# Patient Record
Sex: FEMALE | Race: WHITE | NOT HISPANIC OR LATINO
[De-identification: names, ages, dates, MRNs, and addresses within clinical notes are randomized per-mention and may not be internally consistent; named-entity substitution may affect disease eponyms.]

---

## 2017-03-09 ENCOUNTER — TRANSCRIPTION ENCOUNTER (OUTPATIENT)
Age: 62
End: 2017-03-09

## 2019-04-12 ENCOUNTER — APPOINTMENT (OUTPATIENT)
Dept: ORTHOPEDIC SURGERY | Facility: CLINIC | Age: 64
End: 2019-04-12
Payer: COMMERCIAL

## 2019-04-12 ENCOUNTER — INPATIENT (INPATIENT)
Facility: HOSPITAL | Age: 64
LOS: 1 days | Discharge: HOME | End: 2019-04-14
Attending: STUDENT IN AN ORGANIZED HEALTH CARE EDUCATION/TRAINING PROGRAM | Admitting: STUDENT IN AN ORGANIZED HEALTH CARE EDUCATION/TRAINING PROGRAM
Payer: COMMERCIAL

## 2019-04-12 VITALS
HEART RATE: 66 BPM | BODY MASS INDEX: 22.08 KG/M2 | WEIGHT: 120 LBS | DIASTOLIC BLOOD PRESSURE: 68 MMHG | HEIGHT: 62 IN | SYSTOLIC BLOOD PRESSURE: 106 MMHG

## 2019-04-12 VITALS
HEART RATE: 73 BPM | OXYGEN SATURATION: 100 % | DIASTOLIC BLOOD PRESSURE: 71 MMHG | TEMPERATURE: 98 F | RESPIRATION RATE: 18 BRPM | SYSTOLIC BLOOD PRESSURE: 114 MMHG

## 2019-04-12 LAB
ALBUMIN SERPL ELPH-MCNC: 4.7 G/DL — SIGNIFICANT CHANGE UP (ref 3.3–5)
ALP SERPL-CCNC: 70 U/L — SIGNIFICANT CHANGE UP (ref 40–120)
ALT FLD-CCNC: 27 U/L — SIGNIFICANT CHANGE UP (ref 4–33)
ANION GAP SERPL CALC-SCNC: 12 MMO/L — SIGNIFICANT CHANGE UP (ref 7–14)
AST SERPL-CCNC: 38 U/L — HIGH (ref 4–32)
BILIRUB SERPL-MCNC: 0.6 MG/DL — SIGNIFICANT CHANGE UP (ref 0.2–1.2)
BUN SERPL-MCNC: 21 MG/DL — SIGNIFICANT CHANGE UP (ref 7–23)
CALCIUM SERPL-MCNC: 9.5 MG/DL — SIGNIFICANT CHANGE UP (ref 8.4–10.5)
CHLORIDE SERPL-SCNC: 102 MMOL/L — SIGNIFICANT CHANGE UP (ref 98–107)
CO2 SERPL-SCNC: 27 MMOL/L — SIGNIFICANT CHANGE UP (ref 22–31)
CREAT SERPL-MCNC: 0.81 MG/DL — SIGNIFICANT CHANGE UP (ref 0.5–1.3)
GLUCOSE SERPL-MCNC: 96 MG/DL — SIGNIFICANT CHANGE UP (ref 70–99)
HCT VFR BLD CALC: 41.5 % — SIGNIFICANT CHANGE UP (ref 34.5–45)
HGB BLD-MCNC: 14 G/DL — SIGNIFICANT CHANGE UP (ref 11.5–15.5)
MCHC RBC-ENTMCNC: 30.2 PG — SIGNIFICANT CHANGE UP (ref 27–34)
MCHC RBC-ENTMCNC: 33.7 % — SIGNIFICANT CHANGE UP (ref 32–36)
MCV RBC AUTO: 89.4 FL — SIGNIFICANT CHANGE UP (ref 80–100)
NRBC # FLD: 0 K/UL — SIGNIFICANT CHANGE UP (ref 0–0)
PLATELET # BLD AUTO: 213 K/UL — SIGNIFICANT CHANGE UP (ref 150–400)
PMV BLD: 11.8 FL — SIGNIFICANT CHANGE UP (ref 7–13)
POTASSIUM SERPL-MCNC: 4.2 MMOL/L — SIGNIFICANT CHANGE UP (ref 3.5–5.3)
POTASSIUM SERPL-SCNC: 4.2 MMOL/L — SIGNIFICANT CHANGE UP (ref 3.5–5.3)
PROT SERPL-MCNC: 7.3 G/DL — SIGNIFICANT CHANGE UP (ref 6–8.3)
RBC # BLD: 4.64 M/UL — SIGNIFICANT CHANGE UP (ref 3.8–5.2)
RBC # FLD: 12.4 % — SIGNIFICANT CHANGE UP (ref 10.3–14.5)
SODIUM SERPL-SCNC: 141 MMOL/L — SIGNIFICANT CHANGE UP (ref 135–145)
WBC # BLD: 9.14 K/UL — SIGNIFICANT CHANGE UP (ref 3.8–10.5)
WBC # FLD AUTO: 9.14 K/UL — SIGNIFICANT CHANGE UP (ref 3.8–10.5)

## 2019-04-12 PROCEDURE — 72148 MRI LUMBAR SPINE W/O DYE: CPT | Mod: 26

## 2019-04-12 PROCEDURE — 99203 OFFICE O/P NEW LOW 30 MIN: CPT

## 2019-04-12 RX ORDER — MORPHINE SULFATE 50 MG/1
2 CAPSULE, EXTENDED RELEASE ORAL ONCE
Qty: 0 | Refills: 0 | Status: DISCONTINUED | OUTPATIENT
Start: 2019-04-12 | End: 2019-04-12

## 2019-04-12 RX ORDER — DEXAMETHASONE 0.5 MG/5ML
10 ELIXIR ORAL ONCE
Qty: 0 | Refills: 0 | Status: DISCONTINUED | OUTPATIENT
Start: 2019-04-12 | End: 2019-04-12

## 2019-04-12 RX ORDER — MORPHINE SULFATE 50 MG/1
4 CAPSULE, EXTENDED RELEASE ORAL ONCE
Qty: 0 | Refills: 0 | Status: DISCONTINUED | OUTPATIENT
Start: 2019-04-12 | End: 2019-04-12

## 2019-04-12 RX ORDER — LIDOCAINE 4 G/100G
1 CREAM TOPICAL ONCE
Qty: 0 | Refills: 0 | Status: COMPLETED | OUTPATIENT
Start: 2019-04-12 | End: 2019-04-12

## 2019-04-12 RX ORDER — METHOCARBAMOL 500 MG/1
750 TABLET, FILM COATED ORAL ONCE
Qty: 0 | Refills: 0 | Status: COMPLETED | OUTPATIENT
Start: 2019-04-12 | End: 2019-04-12

## 2019-04-12 RX ORDER — ONDANSETRON 8 MG/1
4 TABLET, FILM COATED ORAL ONCE
Qty: 0 | Refills: 0 | Status: COMPLETED | OUTPATIENT
Start: 2019-04-12 | End: 2019-04-12

## 2019-04-12 RX ORDER — DIAZEPAM 5 MG
5 TABLET ORAL ONCE
Qty: 0 | Refills: 0 | Status: DISCONTINUED | OUTPATIENT
Start: 2019-04-12 | End: 2019-04-12

## 2019-04-12 RX ORDER — DIAZEPAM 5 MG
2 TABLET ORAL ONCE
Qty: 0 | Refills: 0 | Status: DISCONTINUED | OUTPATIENT
Start: 2019-04-12 | End: 2019-04-12

## 2019-04-12 RX ORDER — HYDROMORPHONE HYDROCHLORIDE 2 MG/ML
1 INJECTION INTRAMUSCULAR; INTRAVENOUS; SUBCUTANEOUS ONCE
Qty: 0 | Refills: 0 | Status: DISCONTINUED | OUTPATIENT
Start: 2019-04-12 | End: 2019-04-12

## 2019-04-12 RX ORDER — KETOROLAC TROMETHAMINE 30 MG/ML
30 SYRINGE (ML) INJECTION ONCE
Qty: 0 | Refills: 0 | Status: DISCONTINUED | OUTPATIENT
Start: 2019-04-12 | End: 2019-04-12

## 2019-04-12 RX ORDER — DEXAMETHASONE 0.5 MG/5ML
10 ELIXIR ORAL EVERY 6 HOURS
Qty: 0 | Refills: 0 | Status: DISCONTINUED | OUTPATIENT
Start: 2019-04-12 | End: 2019-04-14

## 2019-04-12 RX ORDER — DEXAMETHASONE 0.5 MG/5ML
10 ELIXIR ORAL ONCE
Qty: 0 | Refills: 0 | Status: COMPLETED | OUTPATIENT
Start: 2019-04-12 | End: 2019-04-12

## 2019-04-12 RX ADMIN — MORPHINE SULFATE 2 MILLIGRAM(S): 50 CAPSULE, EXTENDED RELEASE ORAL at 23:10

## 2019-04-12 RX ADMIN — MORPHINE SULFATE 4 MILLIGRAM(S): 50 CAPSULE, EXTENDED RELEASE ORAL at 14:31

## 2019-04-12 RX ADMIN — Medication 102 MILLIGRAM(S): at 22:38

## 2019-04-12 RX ADMIN — HYDROMORPHONE HYDROCHLORIDE 1 MILLIGRAM(S): 2 INJECTION INTRAMUSCULAR; INTRAVENOUS; SUBCUTANEOUS at 19:02

## 2019-04-12 RX ADMIN — Medication 5 MILLIGRAM(S): at 15:05

## 2019-04-12 RX ADMIN — Medication 30 MILLIGRAM(S): at 22:38

## 2019-04-12 RX ADMIN — MORPHINE SULFATE 2 MILLIGRAM(S): 50 CAPSULE, EXTENDED RELEASE ORAL at 22:42

## 2019-04-12 RX ADMIN — MORPHINE SULFATE 4 MILLIGRAM(S): 50 CAPSULE, EXTENDED RELEASE ORAL at 15:25

## 2019-04-12 RX ADMIN — Medication 102 MILLIGRAM(S): at 16:45

## 2019-04-12 RX ADMIN — Medication 30 MILLIGRAM(S): at 23:10

## 2019-04-12 RX ADMIN — Medication 10 MILLIGRAM(S): at 17:15

## 2019-04-12 RX ADMIN — HYDROMORPHONE HYDROCHLORIDE 1 MILLIGRAM(S): 2 INJECTION INTRAMUSCULAR; INTRAVENOUS; SUBCUTANEOUS at 18:44

## 2019-04-12 RX ADMIN — LIDOCAINE 1 PATCH: 4 CREAM TOPICAL at 16:10

## 2019-04-12 RX ADMIN — METHOCARBAMOL 750 MILLIGRAM(S): 500 TABLET, FILM COATED ORAL at 21:38

## 2019-04-12 RX ADMIN — Medication 10 MILLIGRAM(S): at 23:15

## 2019-04-12 NOTE — ED ADULT TRIAGE NOTE - CHIEF COMPLAINT QUOTE
sent by spine doctor for severe pain, pt c/o left leg pain for 2 weeks worse today denies injury or medical Hx.  reports took steroids, anti inflammatory and NSAID with no relief.

## 2019-04-12 NOTE — ED ADULT NURSE NOTE - NSIMPLEMENTINTERV_GEN_ALL_ED
Implemented All Fall Risk Interventions:  [place] to call system. Call bell, personal items and telephone within reach. Instruct patient to call for assistance. Room bathroom lighting operational. Non-slip footwear when patient is off stretcher. Physically safe environment: no spills, clutter or unnecessary equipment. Stretcher in lowest position, wheels locked, appropriate side rails in place. Provide visual cue, wrist band, yellow gown, etc. Monitor gait and stability. Monitor for mental status changes and reorient to person, place, and time. Review medications for side effects contributing to fall risk. Reinforce activity limits and safety measures with patient and family.

## 2019-04-12 NOTE — ED PROVIDER NOTE - ATTENDING CONTRIBUTION TO CARE
Seen and examined, mild distress, c/o worsening back and leg pain, states used vicodin and metaxalone past 2d without relief, only tylenol this a.m., states leg gets numb during pain but at time of eval. no numbness or focal weakness. Clear lungs, heart reg, NT abd, NT spine, painful ROM L hip to flex/rotation reproducing leg pain, good distal pulses, no edema, NT calves.

## 2019-04-12 NOTE — ED CDU PROVIDER INITIAL DAY NOTE - MEDICAL DECISION MAKING DETAILS
63 yo F with no sig PMHX Pt with left leg pain starting from left buttock extending down left leg. started 2 weeks ago, worse x 3 days. [place] lot of dancing and then a lot of sightseeing with walking, pain then developed after that, no medication as stated above have worked. Denies red flag back pain symptoms. Attempt by ortho team to expedite MRI, MRI backed up. Pt sent to CDU for pain control and MRI.

## 2019-04-12 NOTE — ED PROVIDER NOTE - PHYSICAL EXAMINATION
General: Patient awake alert NAD.   HEENT: normocephalic, atraumatic, EMOI, no scleral icterus.   Cardiac: RRR, S1, S2, no murmur, rubs, gallop.   LUNGS: CTA B/L no wheeze, rhonchi, rales.   Abdomen: soft NT, ND, no rebound no guarding, no CVA tenderness.   EXT: + straight leg raise at 15degreees on left. Negative on right. no edema, no calf tenderness b/l. + abnormal gait due to leg pain.   Neuro: A&Ox3, no focal neurological deficits, CN 2-12 grossly intact  Skin: warm, dry, no rash, no lesions

## 2019-04-12 NOTE — ED PROVIDER NOTE - NS ED ROS FT
GENERAL: No fever, chills  EYES: no vision changes, no discharge.   HEENT: no difficulty  swallowing or speaking   CARDIAC: no chest pain/pressure, SOB, lower ex edema  PULMONARY: no cough, SOB  GI: no abdominal pain, n/v/d/c  : no dysuria, frequency, hematuria  SKIN: no rashes, lesions, vesicles  NEURO: no headache, lightheadedness, paraesthesias.   MSK: + Left leg pain.

## 2019-04-12 NOTE — CONSULT NOTE ADULT - SUBJECTIVE AND OBJECTIVE BOX
Patient seen and examined in the ED.  She presents from Dr. Tao's clinic secondary to acute left lower extremity pain and paresthesia with no inciting mechanism. The patient reports an approximate two week history of lower back pain with progressively worsening left lower extremity pain and subjective weakness over the past three days.   She expresses difficulty with ambulation and laying supine secondary to severe pain described as sharp, burning and cramping, associated with numbness.  Denies saddle anesthesia, issues with bowel or bladder incontinence, gait disturbances, issues with balance or tripping.     The patient has history of lower back pain without radiculopathy many years ago for which she had a lumbar MRI revealing disc herniations.  Pain self-limited.  Patient has not had any epidural injections, physical therapy or medical management for this issue.          HEALTH ISSUES - PROBLEM Dx:          MEDICATIONS  (STANDING):  dexamethasone  Injectable 10 milliGRAM(s) IV Push Once      Allergies    penicillin (Unknown)    Intolerances        PAST MEDICAL & SURGICAL HISTORY:  No pertinent past medical history  No significant past surgical history                            14.0   9.14  )-----------( 213      ( 12 Apr 2019 14:45 )             41.5       12 Apr 2019 14:45    141    |  102    |  21     ----------------------------<  96     4.2     |  27     |  0.81     Ca    9.5        12 Apr 2019 14:45    TPro  7.3    /  Alb  4.7    /  TBili  0.6    /  DBili  x      /  AST  38     /  ALT  27     /  AlkPhos  70     12 Apr 2019 14:45              Vital Signs Last 24 Hrs  T(C): 36.4 (04-12-19 @ 12:54), Max: 36.4 (04-12-19 @ 12:54)  T(F): 97.6 (04-12-19 @ 12:54), Max: 97.6 (04-12-19 @ 12:54)  HR: 73 (04-12-19 @ 12:54) (73 - 73)  BP: 114/71 (04-12-19 @ 12:54) (114/71 - 114/71)  BP(mean): --  RR: 18 (04-12-19 @ 12:54) (18 - 18)  SpO2: 100% (04-12-19 @ 12:54) (100% - 100%)    Gen: NAD    Spine PE:  Skin intact  No gross deformity  No midnline TTP C/T/L/S spine  No bony step offs  No paraspinal muscle ttp/hypertonicity  Positive Straight leg raise- Left  Negative clonus  Negative babinski  Negative an  No saddle anesthesia    Motor:                                L2             L3             L4               L5            S1  R         5/5           5/5          5/5             5/5           5/5  L          5/5          5/5           4/5             4/5           4/5    Sensory:                        L2          L3         L4      L5       S1         (0=absent, 1=impaired, 2=normal, NT=not testable)  R         2            2            2        2        2  L          2            2           2        2         2        A/P: 65 y/o F with acute left lumbar radiculopathy.  Patient is neurologically stable.  We will have her obtain an MRI of the lumbar spine to evaluate for underlying pathology contributing to the clinical manifestations.        Recommend:   STAT MRI Lumbar Spine without Contrast,      IV Dexamethasone 10 mg q 6 h x 4 doses,   PO/IV pain regimen  Ortho to follow-up MRI  Notify Ortho with any changes in neurological status and with any questions  #[id number]

## 2019-04-12 NOTE — ED CDU PROVIDER INITIAL DAY NOTE - PROGRESS NOTE DETAILS
Patient received at sign out from CDU day team, pt presented to ED sent by Orthopedist Dr. Tao for Lt side back pain w/ radiculopathy. Pt seen and worked up in ED, ortho consulted. Pt transferred to CDU for MRI's, will continue with current tx plan, will order robaxin for pain. Will monitor closely.

## 2019-04-12 NOTE — ED PROVIDER NOTE - CLINICAL SUMMARY MEDICAL DECISION MAKING FREE TEXT BOX
65 yo female with no pmhx presents with L leg pain. + straight leg raise. ddx: sciatica, herniation, cord compression. Low suspicion for cord compression due to no red flag symptoms. Pain control, MRI, ortho consult.

## 2019-04-12 NOTE — ED PROVIDER NOTE - OBJECTIVE STATEMENT
65 yo female with no pmhx presents with L leg pain. Leg pain starts in the L buttock, sharp electric shock and numbness that radiates down to toes, worse with flexion of hip. Pain chronic, but intermittently worse. At baseline, patient is unable to find comfortable position. During a spasm episode, pt is unable to stand or walk. Denies urinary and bowel incontinence/retention, saddle anesthesia or lower extremity weakness, no other complaints. Took Ibuprofen, Metaxalone, Vicodin today without relief. Pt sent in from Dr. Tao (ortho) office for pain control and MRI. Per patient and , she may require surgery this weekend.    Triage note read, states back pain, but pt denies back pain, endorse that it is L leg sciatica pain. 65 yo female with no pmhx presents with L leg pain x 3 days. Leg pain starts in the L buttock, sharp electric shock and numbness that radiates down to toes, worse with flexion of hip. Pain chronic, but intermittently worse. At baseline, patient is unable to find comfortable position. During a spasm episode, pt is unable to stand or walk. Denies urinary and bowel incontinence/retention, saddle anesthesia or lower extremity weakness, no other complaints. Took Ibuprofen, Metaxalone, prednisone today and Vicodin yesterday without relief. Pt sent in from Dr. Tao (ortho) office for pain control and MRI. Per patient and , she may require surgery this weekend.    Triage note read, states back pain, but pt denies back pain, endorse that it is L leg sciatica pain.

## 2019-04-12 NOTE — H&P ADULT - HISTORY OF PRESENT ILLNESS
Patient seen and examined in the ED.  She presents from Dr. Tao's clinic secondary to acute left lower extremity pain and paresthesia with no inciting mechanism. The patient reports an approximate two week history of lower back pain with progressively worsening left lower extremity pain and subjective weakness over the past three days.   She expresses difficulty with ambulation and laying supine secondary to severe pain described as sharp, burning and cramping, associated with numbness.  Denies saddle anesthesia, issues with bowel or bladder incontinence, gait disturbances, issues with balance or tripping.     The patient has history of lower back pain without radiculopathy many years ago for which she had a lumbar MRI revealing disc herniations.  Pain self-limited.  Patient has not had any epidural injections, physical therapy or medical management for this issue.          HEALTH ISSUES - PROBLEM Dx:          MEDICATIONS  (STANDING):  dexamethasone  Injectable 10 milliGRAM(s) IV Push Once      Allergies    penicillin (Unknown)    Intolerances        PAST MEDICAL & SURGICAL HISTORY:  No pertinent past medical history  No significant past surgical history                            14.0   9.14  )-----------( 213      ( 12 Apr 2019 14:45 )             41.5       12 Apr 2019 14:45    141    |  102    |  21     ----------------------------<  96     4.2     |  27     |  0.81     Ca    9.5        12 Apr 2019 14:45    TPro  7.3    /  Alb  4.7    /  TBili  0.6    /  DBili  x      /  AST  38     /  ALT  27     /  AlkPhos  70     12 Apr 2019 14:45              Vital Signs Last 24 Hrs  T(C): 36.4 (04-12-19 @ 12:54), Max: 36.4 (04-12-19 @ 12:54)  T(F): 97.6 (04-12-19 @ 12:54), Max: 97.6 (04-12-19 @ 12:54)  HR: 73 (04-12-19 @ 12:54) (73 - 73)  BP: 114/71 (04-12-19 @ 12:54) (114/71 - 114/71)  BP(mean): --  RR: 18 (04-12-19 @ 12:54) (18 - 18)  SpO2: 100% (04-12-19 @ 12:54) (100% - 100%)    Gen: NAD    Spine PE:  Skin intact  No gross deformity  No midnline TTP C/T/L/S spine  No bony step offs  No paraspinal muscle ttp/hypertonicity  Positive Straight leg raise- Left  Negative clonus  Negative babinski  Negative an  No saddle anesthesia    Motor:                                L2             L3             L4               L5            S1  R         5/5           5/5          5/5             5/5           5/5  L          5/5          5/5           4/5             4/5           4/5    Sensory:                        L2          L3         L4      L5       S1         (0=absent, 1=impaired, 2=normal, NT=not testable)  R         2            2            2        2        2  L          2            2           2        2         2        A/P: 63 y/o F with acute left lumbar radiculopathy.  Patient is neurologically stable.        Recommend:    - MRI Lumbar Spine without Contrast,      - IV Dexamethasone 10 mg q 6 h x 4 doses,   - PO/IV pain regimen  - Ortho to admit (Dr. Tao) for observation

## 2019-04-12 NOTE — ED CDU PROVIDER INITIAL DAY NOTE - NEURO NEGATIVE STATEMENT, MLM
no loss of consciousness, no gait abnormality, no headache, no sensory deficits, and left leg pain and weakness

## 2019-04-12 NOTE — HISTORY OF PRESENT ILLNESS
[de-identified] : Ms. SANTA HERNANDEZ  is a 64 year old female who presents with intractable left leg pain for the past 2 weeks.  Minimal back pain  Normal bowel and bladder control.   Denies any recent fevers, chills, sweats, weight loss, or infection.  She has not been able to sleep for the past 2 nights. \par

## 2019-04-12 NOTE — ED CDU PROVIDER INITIAL DAY NOTE - DETAILS
63 yo F with back pain left sided, to left lower leg x 2 weeks worse x 3 days. no previous surgeries.  orthopedic Dr Tao, sent in by office.

## 2019-04-12 NOTE — ED PROVIDER NOTE - PROGRESS NOTE DETAILS
Lyly Triplett M.D. Resident  Called Dr. Tao office. Unable to reach him. Lyly Triplett M.D. Resident  Paged ortho resident

## 2019-04-12 NOTE — ED ADULT NURSE NOTE - OBJECTIVE STATEMENT
pt c/o back pain radiating down left leg x2 weeks, sent by PMD. Line placed, labs sent, medicated as per order.

## 2019-04-12 NOTE — PHYSICAL EXAM
[Wheelchair] : uses a wheelchair [de-identified] : On physical examination she is in significant pain seated in a wheelchair. Markedly positive straight leg raise. Grossly preserved strength limited by pain.

## 2019-04-12 NOTE — DISCUSSION/SUMMARY
[de-identified] : Her severe intractable and worsening pain I recommended emergent evaluation in the emergency room. I will followup with her care there.

## 2019-04-12 NOTE — ED CDU PROVIDER INITIAL DAY NOTE - ATTENDING CONTRIBUTION TO CARE
Dr. Brothers:  I performed a face to face bedside interview with patient regarding history of present illness, review of symptoms and past medical history. I completed an independent physical exam.  I have discussed patient's plan of care with PA.   I agree with note as stated above, having amended the EMR as needed to reflect my findings.   This includes HISTORY OF PRESENT ILLNESS, HIV, PAST MEDICAL/SURGICAL/FAMILY/SOCIAL HISTORY, ALLERGIES AND HOME MEDICATIONS, REVIEW OF SYSTEMS, PHYSICAL EXAM, and any PROGRESS NOTES during the time I functioned as the attending physician for this patient.    64F denies pmh presented to ED with lower back pain radiating to LLE that is limiting her ability to walk.  +Associated spasms.  Denies incontinence/retention, numbness, weakness.  Had seen her orthopedic doctor (Dr. Tao) and given oral meds but pain not controlled.  Sent to ED for further evaluation/management.    Exam:  - nad  - rrr  - ctab  - abd soft ntnd  - no midline spinal TTP, 5/5 strength dorsiflexion and plantar flexion, 2+ pedal pulses    A/P  - LLE pain and back pain  - MRI  - f/u ortho recs

## 2019-04-13 DIAGNOSIS — M54.16 RADICULOPATHY, LUMBAR REGION: ICD-10-CM

## 2019-04-13 LAB — CK SERPL-CCNC: 65 U/L — SIGNIFICANT CHANGE UP (ref 25–170)

## 2019-04-13 RX ORDER — OXYCODONE AND ACETAMINOPHEN 5; 325 MG/1; MG/1
1 TABLET ORAL ONCE
Qty: 0 | Refills: 0 | Status: DISCONTINUED | OUTPATIENT
Start: 2019-04-13 | End: 2019-04-13

## 2019-04-13 RX ORDER — HYDROMORPHONE HYDROCHLORIDE 2 MG/ML
0.5 INJECTION INTRAMUSCULAR; INTRAVENOUS; SUBCUTANEOUS EVERY 4 HOURS
Qty: 0 | Refills: 0 | Status: DISCONTINUED | OUTPATIENT
Start: 2019-04-13 | End: 2019-04-14

## 2019-04-13 RX ORDER — ACETAMINOPHEN 500 MG
1000 TABLET ORAL ONCE
Qty: 0 | Refills: 0 | Status: COMPLETED | OUTPATIENT
Start: 2019-04-13 | End: 2019-04-13

## 2019-04-13 RX ORDER — OXYCODONE HYDROCHLORIDE 5 MG/1
10 TABLET ORAL EVERY 6 HOURS
Qty: 0 | Refills: 0 | Status: DISCONTINUED | OUTPATIENT
Start: 2019-04-13 | End: 2019-04-14

## 2019-04-13 RX ORDER — DEXAMETHASONE 0.5 MG/5ML
10 ELIXIR ORAL EVERY 6 HOURS
Qty: 0 | Refills: 0 | Status: DISCONTINUED | OUTPATIENT
Start: 2019-04-13 | End: 2019-04-13

## 2019-04-13 RX ORDER — ACETAMINOPHEN 500 MG
1000 TABLET ORAL ONCE
Qty: 0 | Refills: 0 | Status: COMPLETED | OUTPATIENT
Start: 2019-04-14 | End: 2019-04-14

## 2019-04-13 RX ORDER — MAGNESIUM HYDROXIDE 400 MG/1
30 TABLET, CHEWABLE ORAL EVERY 12 HOURS
Qty: 0 | Refills: 0 | Status: DISCONTINUED | OUTPATIENT
Start: 2019-04-13 | End: 2019-04-14

## 2019-04-13 RX ORDER — KETOROLAC TROMETHAMINE 30 MG/ML
30 SYRINGE (ML) INJECTION EVERY 6 HOURS
Qty: 0 | Refills: 0 | Status: DISCONTINUED | OUTPATIENT
Start: 2019-04-13 | End: 2019-04-14

## 2019-04-13 RX ORDER — SENNA PLUS 8.6 MG/1
2 TABLET ORAL AT BEDTIME
Qty: 0 | Refills: 0 | Status: DISCONTINUED | OUTPATIENT
Start: 2019-04-13 | End: 2019-04-14

## 2019-04-13 RX ORDER — DIAZEPAM 5 MG
5 TABLET ORAL ONCE
Qty: 0 | Refills: 0 | Status: DISCONTINUED | OUTPATIENT
Start: 2019-04-13 | End: 2019-04-13

## 2019-04-13 RX ORDER — DIAZEPAM 5 MG
5 TABLET ORAL EVERY 12 HOURS
Qty: 0 | Refills: 0 | Status: DISCONTINUED | OUTPATIENT
Start: 2019-04-13 | End: 2019-04-14

## 2019-04-13 RX ORDER — DOCUSATE SODIUM 100 MG
100 CAPSULE ORAL THREE TIMES A DAY
Qty: 0 | Refills: 0 | Status: DISCONTINUED | OUTPATIENT
Start: 2019-04-13 | End: 2019-04-14

## 2019-04-13 RX ORDER — SODIUM CHLORIDE 9 MG/ML
1000 INJECTION INTRAMUSCULAR; INTRAVENOUS; SUBCUTANEOUS ONCE
Qty: 0 | Refills: 0 | Status: COMPLETED | OUTPATIENT
Start: 2019-04-13 | End: 2019-04-13

## 2019-04-13 RX ORDER — ACETAMINOPHEN 500 MG
650 TABLET ORAL EVERY 6 HOURS
Qty: 0 | Refills: 0 | Status: DISCONTINUED | OUTPATIENT
Start: 2019-04-13 | End: 2019-04-13

## 2019-04-13 RX ORDER — KETOROLAC TROMETHAMINE 30 MG/ML
30 SYRINGE (ML) INJECTION ONCE
Qty: 0 | Refills: 0 | Status: DISCONTINUED | OUTPATIENT
Start: 2019-04-13 | End: 2019-04-13

## 2019-04-13 RX ORDER — OXYCODONE HYDROCHLORIDE 5 MG/1
5 TABLET ORAL EVERY 4 HOURS
Qty: 0 | Refills: 0 | Status: DISCONTINUED | OUTPATIENT
Start: 2019-04-13 | End: 2019-04-14

## 2019-04-13 RX ADMIN — HYDROMORPHONE HYDROCHLORIDE 0.5 MILLIGRAM(S): 2 INJECTION INTRAMUSCULAR; INTRAVENOUS; SUBCUTANEOUS at 22:20

## 2019-04-13 RX ADMIN — OXYCODONE AND ACETAMINOPHEN 1 TABLET(S): 5; 325 TABLET ORAL at 04:40

## 2019-04-13 RX ADMIN — Medication 400 MILLIGRAM(S): at 19:19

## 2019-04-13 RX ADMIN — OXYCODONE AND ACETAMINOPHEN 1 TABLET(S): 5; 325 TABLET ORAL at 07:39

## 2019-04-13 RX ADMIN — Medication 30 MILLIGRAM(S): at 11:18

## 2019-04-13 RX ADMIN — Medication 400 MILLIGRAM(S): at 13:16

## 2019-04-13 RX ADMIN — OXYCODONE HYDROCHLORIDE 10 MILLIGRAM(S): 5 TABLET ORAL at 20:00

## 2019-04-13 RX ADMIN — OXYCODONE AND ACETAMINOPHEN 1 TABLET(S): 5; 325 TABLET ORAL at 08:04

## 2019-04-13 RX ADMIN — HYDROMORPHONE HYDROCHLORIDE 0.5 MILLIGRAM(S): 2 INJECTION INTRAMUSCULAR; INTRAVENOUS; SUBCUTANEOUS at 16:27

## 2019-04-13 RX ADMIN — Medication 30 MILLIGRAM(S): at 17:34

## 2019-04-13 RX ADMIN — Medication 5 MILLIGRAM(S): at 08:50

## 2019-04-13 RX ADMIN — HYDROMORPHONE HYDROCHLORIDE 0.5 MILLIGRAM(S): 2 INJECTION INTRAMUSCULAR; INTRAVENOUS; SUBCUTANEOUS at 21:53

## 2019-04-13 RX ADMIN — Medication 102 MILLIGRAM(S): at 17:34

## 2019-04-13 RX ADMIN — LIDOCAINE 1 PATCH: 4 CREAM TOPICAL at 03:00

## 2019-04-13 RX ADMIN — Medication 100 MILLIGRAM(S): at 13:16

## 2019-04-13 RX ADMIN — Medication 102 MILLIGRAM(S): at 10:58

## 2019-04-13 RX ADMIN — SODIUM CHLORIDE 1000 MILLILITER(S): 9 INJECTION INTRAMUSCULAR; INTRAVENOUS; SUBCUTANEOUS at 12:03

## 2019-04-13 RX ADMIN — Medication 30 MILLIGRAM(S): at 12:03

## 2019-04-13 RX ADMIN — OXYCODONE AND ACETAMINOPHEN 1 TABLET(S): 5; 325 TABLET ORAL at 03:46

## 2019-04-13 RX ADMIN — Medication 102 MILLIGRAM(S): at 21:53

## 2019-04-13 RX ADMIN — OXYCODONE HYDROCHLORIDE 10 MILLIGRAM(S): 5 TABLET ORAL at 11:18

## 2019-04-13 RX ADMIN — OXYCODONE HYDROCHLORIDE 10 MILLIGRAM(S): 5 TABLET ORAL at 19:19

## 2019-04-13 RX ADMIN — Medication 102 MILLIGRAM(S): at 04:09

## 2019-04-13 RX ADMIN — Medication 10 MILLIGRAM(S): at 04:40

## 2019-04-13 RX ADMIN — OXYCODONE HYDROCHLORIDE 10 MILLIGRAM(S): 5 TABLET ORAL at 12:03

## 2019-04-13 RX ADMIN — HYDROMORPHONE HYDROCHLORIDE 0.5 MILLIGRAM(S): 2 INJECTION INTRAMUSCULAR; INTRAVENOUS; SUBCUTANEOUS at 16:12

## 2019-04-13 NOTE — ED CDU PROVIDER SUBSEQUENT DAY NOTE - HISTORY
Patient is a 64 y.o female with no significant PMHx who presents to ED c/o Lt back pain and leg pain x 2 weeks but worse over past 3 days. Pt describes pain as sharp, shooting pain down Lt leg, a/w difficulty ambulating and change in position. Pt went to Dr. Tao (ortho) yesterday and advised to come to ED. Pt worked up in ED, sent to CDU for MR lumbar, ortho following, IV decadron, pain control and frequent re-evals. Pt denies saddle anesthesia, changes in bowel or bladder habits, weakness, recent trauma/falls, injections, fevers chills, recent travel or any other complaints. Pt admits to some pain at this time, no other complaints noted. Will continue with current CDU tx plan.

## 2019-04-13 NOTE — OCCUPATIONAL THERAPY INITIAL EVALUATION ADULT - PLANNED THERAPY INTERVENTIONS, OT EVAL
ADL retraining/bed mobility training/transfer training/strengthening/balance training/neuromuscular re-education

## 2019-04-13 NOTE — ED CDU PROVIDER SUBSEQUENT DAY NOTE - PHYSICAL EXAMINATION
Vital signs reviewed.   CONSTITUTIONAL: Well-appearing; well-nourished; in no apparent distress. Non-toxic appearing.   HEAD: Normocephalic, atraumatic.  EYES: PERRL, EOM intact, conjunctiva and sclera WNL.  ENT: normal nose; no rhinorrhea  NECK/LYMPH: Supple; non-tender; no cervical lymphadenopathy.  CARD: Normal S1, S2; no murmurs, rubs, or gallops noted.  RESP: Normal chest excursion with respiration; breath sounds clear and equal bilaterally; no wheezes, rhonchi, or rales.  EXT/MS: moves all extremities; distal pulses are normal, no pedal edema. +Lt SLR. Negative Rt SLR. 5/5 strength UE/LE b/l. No crepitus or obvious deformities. compartments soft. No calf pain or swelling.   SKIN: Normal for age and race; warm; dry; good turgor; no apparent lesions or exudate noted. No rashes noted.   NEURO: Awake, alert, oriented x 3, no gross deficits, no motor or sensory deficit noted.  PSYCH: Normal mood; appropriate affect.

## 2019-04-13 NOTE — ED CDU PROVIDER SUBSEQUENT DAY NOTE - PROGRESS NOTE DETAILS
Results of MR +for L4 nerve root impingement with disc bulge. Ortho aware, came and re-evaluated patient, recommends continue current cdu plan and will reassess this am, no surgical intervention at this time. Pt having some pain, will order analgesic. Will continue with CDU plan; Pain control, IV decadron q6, vitals q4, ortho following, will monitor closely. Patient signed out to me to f/u for left sciatica pain and ortho reassessment. Pt re-evaluated by ortho, states to give Valium 5mg, will discuss with ortho attending, will possibly admit to ortho. Will continue to monitor and reassess.

## 2019-04-13 NOTE — ED CDU PROVIDER SUBSEQUENT DAY NOTE - ATTENDING CONTRIBUTION TO CARE
I performed a face to face bedside interview with patient regarding history of present illness, review of symptoms and past medical history. I completed an independent physical exam.  I have discussed patient's plan of care with PA.   I agree with note as stated above, having amended the EMR as needed to reflect my findings. I have discussed the assessment and plan of care.  This includes during the time I functioned as the attending physician for this patient.    Attending Exam - Dr. Gómez: GEN: well appearing, NAD  HEENT: +PERRL, EOMI  RESP: CTAB, no signs of respiratory distress CV: s1s2 RRR ABD: soft/non tender/non distended  MSK: no deformities / swelling, normal range of motion, spine grossly normal NEURO: alert, non focal exam SKIN: normal color / temperature / condition.

## 2019-04-13 NOTE — ED CDU PROVIDER DISPOSITION NOTE - CLINICAL COURSE
Patient brought to CDU for pain control, MRI of spine, re-evaluated by orthopedics and admitted to their service for pain control.

## 2019-04-13 NOTE — PHYSICAL THERAPY INITIAL EVALUATION ADULT - PERTINENT HX OF CURRENT PROBLEM, REHAB EVAL
presents from Dr. Tao's clinic secondary to acute left lower extremity pain and paresthesia with no inciting mechanism. The patient reports an approximate two week history of lower back pain with progressively worsening left lower extremity pain and subjective weakness over the past three days.

## 2019-04-13 NOTE — ED CDU PROVIDER SUBSEQUENT DAY NOTE - MEDICAL DECISION MAKING DETAILS
Patient is a 64 y.o female with no significant PMHx who presents to ED c/o Lt back pain and leg pain x 2 weeks but worse over past 3 days.  CDU plan; Pain control, IV decadron q6, vitals q4, ortho following, will monitor closely.

## 2019-04-14 ENCOUNTER — INBOUND DOCUMENT (OUTPATIENT)
Age: 64
End: 2019-04-14

## 2019-04-14 ENCOUNTER — TRANSCRIPTION ENCOUNTER (OUTPATIENT)
Age: 64
End: 2019-04-14

## 2019-04-14 VITALS
SYSTOLIC BLOOD PRESSURE: 102 MMHG | TEMPERATURE: 99 F | HEART RATE: 70 BPM | RESPIRATION RATE: 16 BRPM | OXYGEN SATURATION: 97 % | DIASTOLIC BLOOD PRESSURE: 50 MMHG

## 2019-04-14 LAB
ANION GAP SERPL CALC-SCNC: 12 MMO/L — SIGNIFICANT CHANGE UP (ref 7–14)
BASOPHILS # BLD AUTO: 0 K/UL — SIGNIFICANT CHANGE UP (ref 0–0.2)
BASOPHILS NFR BLD AUTO: 0 % — SIGNIFICANT CHANGE UP (ref 0–2)
BUN SERPL-MCNC: 24 MG/DL — HIGH (ref 7–23)
CALCIUM SERPL-MCNC: 8.9 MG/DL — SIGNIFICANT CHANGE UP (ref 8.4–10.5)
CHLORIDE SERPL-SCNC: 101 MMOL/L — SIGNIFICANT CHANGE UP (ref 98–107)
CO2 SERPL-SCNC: 24 MMOL/L — SIGNIFICANT CHANGE UP (ref 22–31)
CREAT SERPL-MCNC: 0.82 MG/DL — SIGNIFICANT CHANGE UP (ref 0.5–1.3)
EOSINOPHIL # BLD AUTO: 0 K/UL — SIGNIFICANT CHANGE UP (ref 0–0.5)
EOSINOPHIL NFR BLD AUTO: 0 % — SIGNIFICANT CHANGE UP (ref 0–6)
GLUCOSE SERPL-MCNC: 120 MG/DL — HIGH (ref 70–99)
HCT VFR BLD CALC: 36.1 % — SIGNIFICANT CHANGE UP (ref 34.5–45)
HCV AB S/CO SERPL IA: 0.06 S/CO — SIGNIFICANT CHANGE UP (ref 0–0.99)
HCV AB SERPL-IMP: SIGNIFICANT CHANGE UP
HGB BLD-MCNC: 12 G/DL — SIGNIFICANT CHANGE UP (ref 11.5–15.5)
IMM GRANULOCYTES NFR BLD AUTO: 0.7 % — SIGNIFICANT CHANGE UP (ref 0–1.5)
LYMPHOCYTES # BLD AUTO: 0.74 K/UL — LOW (ref 1–3.3)
LYMPHOCYTES # BLD AUTO: 7.8 % — LOW (ref 13–44)
MCHC RBC-ENTMCNC: 30.2 PG — SIGNIFICANT CHANGE UP (ref 27–34)
MCHC RBC-ENTMCNC: 33.2 % — SIGNIFICANT CHANGE UP (ref 32–36)
MCV RBC AUTO: 90.9 FL — SIGNIFICANT CHANGE UP (ref 80–100)
MONOCYTES # BLD AUTO: 0.3 K/UL — SIGNIFICANT CHANGE UP (ref 0–0.9)
MONOCYTES NFR BLD AUTO: 3.2 % — SIGNIFICANT CHANGE UP (ref 2–14)
NEUTROPHILS # BLD AUTO: 8.33 K/UL — HIGH (ref 1.8–7.4)
NEUTROPHILS NFR BLD AUTO: 88.3 % — HIGH (ref 43–77)
NRBC # FLD: 0 K/UL — SIGNIFICANT CHANGE UP (ref 0–0)
PLATELET # BLD AUTO: 187 K/UL — SIGNIFICANT CHANGE UP (ref 150–400)
PMV BLD: 12.4 FL — SIGNIFICANT CHANGE UP (ref 7–13)
POTASSIUM SERPL-MCNC: 4.5 MMOL/L — SIGNIFICANT CHANGE UP (ref 3.5–5.3)
POTASSIUM SERPL-SCNC: 4.5 MMOL/L — SIGNIFICANT CHANGE UP (ref 3.5–5.3)
RBC # BLD: 3.97 M/UL — SIGNIFICANT CHANGE UP (ref 3.8–5.2)
RBC # FLD: 12.2 % — SIGNIFICANT CHANGE UP (ref 10.3–14.5)
SODIUM SERPL-SCNC: 137 MMOL/L — SIGNIFICANT CHANGE UP (ref 135–145)
WBC # BLD: 9.44 K/UL — SIGNIFICANT CHANGE UP (ref 3.8–10.5)
WBC # FLD AUTO: 9.44 K/UL — SIGNIFICANT CHANGE UP (ref 3.8–10.5)

## 2019-04-14 RX ORDER — OXYCODONE HYDROCHLORIDE 5 MG/1
1 TABLET ORAL
Qty: 12 | Refills: 0 | OUTPATIENT
Start: 2019-04-14 | End: 2019-04-15

## 2019-04-14 RX ORDER — DIAZEPAM 5 MG
1 TABLET ORAL
Qty: 10 | Refills: 0 | OUTPATIENT
Start: 2019-04-14 | End: 2019-04-18

## 2019-04-14 RX ORDER — SENNA PLUS 8.6 MG/1
2 TABLET ORAL
Qty: 20 | Refills: 0 | OUTPATIENT
Start: 2019-04-14 | End: 2019-04-23

## 2019-04-14 RX ORDER — OXYCODONE HYDROCHLORIDE 5 MG/1
10 TABLET ORAL EVERY 4 HOURS
Qty: 0 | Refills: 0 | Status: DISCONTINUED | OUTPATIENT
Start: 2019-04-14 | End: 2019-04-14

## 2019-04-14 RX ADMIN — Medication 400 MILLIGRAM(S): at 00:22

## 2019-04-14 RX ADMIN — OXYCODONE HYDROCHLORIDE 10 MILLIGRAM(S): 5 TABLET ORAL at 01:50

## 2019-04-14 RX ADMIN — OXYCODONE HYDROCHLORIDE 10 MILLIGRAM(S): 5 TABLET ORAL at 13:26

## 2019-04-14 RX ADMIN — HYDROMORPHONE HYDROCHLORIDE 0.5 MILLIGRAM(S): 2 INJECTION INTRAMUSCULAR; INTRAVENOUS; SUBCUTANEOUS at 03:41

## 2019-04-14 RX ADMIN — Medication 30 MILLIGRAM(S): at 05:08

## 2019-04-14 RX ADMIN — Medication 102 MILLIGRAM(S): at 10:48

## 2019-04-14 RX ADMIN — OXYCODONE HYDROCHLORIDE 10 MILLIGRAM(S): 5 TABLET ORAL at 02:20

## 2019-04-14 RX ADMIN — OXYCODONE HYDROCHLORIDE 10 MILLIGRAM(S): 5 TABLET ORAL at 10:02

## 2019-04-14 RX ADMIN — OXYCODONE HYDROCHLORIDE 10 MILLIGRAM(S): 5 TABLET ORAL at 14:00

## 2019-04-14 RX ADMIN — OXYCODONE HYDROCHLORIDE 10 MILLIGRAM(S): 5 TABLET ORAL at 07:51

## 2019-04-14 RX ADMIN — OXYCODONE HYDROCHLORIDE 10 MILLIGRAM(S): 5 TABLET ORAL at 10:30

## 2019-04-14 RX ADMIN — Medication 100 MILLIGRAM(S): at 05:08

## 2019-04-14 RX ADMIN — Medication 102 MILLIGRAM(S): at 04:37

## 2019-04-14 RX ADMIN — Medication 30 MILLIGRAM(S): at 00:22

## 2019-04-14 RX ADMIN — HYDROMORPHONE HYDROCHLORIDE 0.5 MILLIGRAM(S): 2 INJECTION INTRAMUSCULAR; INTRAVENOUS; SUBCUTANEOUS at 04:10

## 2019-04-14 RX ADMIN — Medication 102 MILLIGRAM(S): at 14:58

## 2019-04-14 RX ADMIN — Medication 400 MILLIGRAM(S): at 07:29

## 2019-04-14 NOTE — DISCHARGE NOTE PROVIDER - CARE PROVIDER_API CALL
Robert Tao)  Orthopaedic Surgery  [address], [address]  [place] [address]  Phone: [phone]  Fax: [phone]  Follow Up Time: 1 week

## 2019-04-14 NOTE — DISCHARGE NOTE NURSING/CASE MANAGEMENT/SOCIAL WORK - NSDCDPATPORTLINK_GEN_ALL_CORE
You can access the [facility], offered by [facility], by registering with the following website: [email or web address]

## 2019-04-14 NOTE — DISCHARGE NOTE PROVIDER - NSDCFUADDAPPT_GEN_ALL_CORE_FT
Please follow up with Dr. Tao within 1-2 weeks. You may call office  to schedule an appointment.    You may resume a regular diet and regular activities. Please do not participate in heavy lifting or strenuous exercise. Do not drive while taking pain medication.    Please go to the emergency department if you experience pain not controlled by pain medication. Take pain medications and medrol steroid taper as indicated.

## 2019-04-14 NOTE — DISCHARGE NOTE NURSING/CASE MANAGEMENT/SOCIAL WORK - NSDCPNINST_GEN_ALL_CORE
Take pain medication as ordered. Be aware of side effects of narcotics including: constipation, pruritis, and possibly feeling sleepy. Do not drive while taking narcotics. Start physical therapy as recommended. If you have any change in sensation, pain, or worsening numbness, call MD.

## 2019-04-14 NOTE — PROGRESS NOTE ADULT - SUBJECTIVE AND OBJECTIVE BOX
Orthopaedic Surgery Progress Note    Subjective:   Patient seen and examined today. No acute events overnight. Pain is moderately controlled. Denies f/c, chest pain, sob, dizziness.    Objective:  T(C): 36.3 (04-13-19 @ 21:02), Max: 36.8 (04-13-19 @ 11:03)  HR: 80 (04-13-19 @ 21:02) (66 - 80)  BP: 100/43 (04-13-19 @ 21:02) (92/46 - 102/61)  RR: 17 (04-13-19 @ 21:02) (14 - 18)  SpO2: 95% (04-13-19 @ 21:02) (95% - 99%)  Wt(kg): --      PE    Gen: NAD    Spine PE:  Skin intact  No gross deformity  No midnline TTP C/T/L/S spine  No bony step offs  No paraspinal muscle ttp/hypertonicity  Positive Straight leg raise- Left  Negative clonus  Negative babinski  Negative an  No saddle anesthesia    Motor:                                L2             L3             L4               L5            S1  R         5/5           5/5          5/5             5/5           5/5  L          5/5          5/5           4/5             4/5           4/5    Sensory:                        L2          L3         L4      L5       S1         (0=absent, 1=impaired, 2=normal, NT=not testable)  R         2            2            2        2        2  L          2            2           2        2         2                            14.0   9.14  )-----------( 213      ( 12 Apr 2019 14:45 )             41.5     04-12    141  |  102  |  21  ----------------------------<  96  4.2   |  27  |  0.81    Ca    9.5      12 Apr 2019 14:45    TPro  7.3  /  Alb  4.7  /  TBili  0.6  /  DBili  x   /  AST  38<H>  /  ALT  27  /  AlkPhos  70  04-12          64y Female s/p   - Pain control  - FU labs  - WBAT  - PT/OT/OOB  - I/S  - Monitor HMV output  - SCDs  - Dispo planning

## 2019-04-14 NOTE — PROGRESS NOTE ADULT - ASSESSMENT
A/P: 63 y/o F with acute left lumbar radiculopathy.  Patient is neurologically stable.        Recommend:    - IV Dexamethasone 10 mg q 6 h x 4 doses,   - pain control  - likely dispo home with home PT today  - regular diet  - no acute surgical intervention at this time

## 2019-04-14 NOTE — DISCHARGE NOTE PROVIDER - HOSPITAL COURSE
HPI          She presented from Dr. Tao's clinic secondary to acute left lower extremity pain and paresthesia with no inciting mechanism. The patient reports an approximate two week history of lower back pain with progressively worsening left lower extremity pain and subjective weakness over the past three days.   She expresses difficulty with ambulation and laying supine secondary to severe pain described as sharp, burning and cramping, associated with numbness.  Denies saddle anesthesia, issues with bowel or bladder incontinence, gait disturbances, issues with balance or tripping.         The patient has history of lower back pain without radiculopathy many years ago for which she had a lumbar MRI revealing disc herniations.  Pain self-limited.  Patient has not had any epidural injections, physical therapy or medical management for this issue.         Patient received IV decadron and pain was treated with opioids, analgesics, and valium.        The patient had an MRI while in ED that showed multilevel spondylosis, L4-L5 left foraminal disc protrusion w/ impingement on L4 nerve root.         Pain and symptoms did not improve while in ED so she was admitted for pain control.         On HD day 2 her pain was improved and she was deemed stable for dc.

## 2019-04-15 ENCOUNTER — CHART COPY (OUTPATIENT)
Age: 64
End: 2019-04-15

## 2019-04-24 ENCOUNTER — APPOINTMENT (OUTPATIENT)
Dept: ORTHOPEDIC SURGERY | Facility: CLINIC | Age: 64
End: 2019-04-24
Payer: COMMERCIAL

## 2019-04-24 PROCEDURE — 99214 OFFICE O/P EST MOD 30 MIN: CPT

## 2019-04-26 ENCOUNTER — CHART COPY (OUTPATIENT)
Age: 64
End: 2019-04-26

## 2019-04-29 ENCOUNTER — CHART COPY (OUTPATIENT)
Age: 64
End: 2019-04-29

## 2019-04-29 ENCOUNTER — OUTPATIENT (OUTPATIENT)
Dept: OUTPATIENT SERVICES | Facility: HOSPITAL | Age: 64
LOS: 1 days | End: 2019-04-29
Payer: COMMERCIAL

## 2019-04-29 VITALS
OXYGEN SATURATION: 97 % | WEIGHT: 126.1 LBS | HEIGHT: 60.5 IN | HEART RATE: 95 BPM | TEMPERATURE: 98 F | RESPIRATION RATE: 16 BRPM | SYSTOLIC BLOOD PRESSURE: 114 MMHG | DIASTOLIC BLOOD PRESSURE: 70 MMHG

## 2019-04-29 DIAGNOSIS — Z98.891 HISTORY OF UTERINE SCAR FROM PREVIOUS SURGERY: Chronic | ICD-10-CM

## 2019-04-29 DIAGNOSIS — M51.26 OTHER INTERVERTEBRAL DISC DISPLACEMENT, LUMBAR REGION: ICD-10-CM

## 2019-04-29 DIAGNOSIS — M51.86 OTHER INTERVERTEBRAL DISC DISORDERS, LUMBAR REGION: ICD-10-CM

## 2019-04-29 LAB
BLD GP AB SCN SERPL QL: NEGATIVE — SIGNIFICANT CHANGE UP
RH IG SCN BLD-IMP: POSITIVE — SIGNIFICANT CHANGE UP

## 2019-04-29 PROCEDURE — 93010 ELECTROCARDIOGRAM REPORT: CPT

## 2019-04-29 NOTE — H&P PST ADULT - HISTORY OF PRESENT ILLNESS
63 yo female with no pertinent PMH presents to pre surgical testing.  Pt reports she suddenly started to have intractable left buttock pain radiating down to left foot about 1 month ago, was seen at [facility] ED, MRI done revealing nerve impingement at the lumbar level.  Pt is scheduled for posterior L4-5 lumbar discectomy on 5/2/19. 63 yo female with no pertinent PMH presents to pre surgical testing.  Pt reports she suddenly started to have intractable left buttock pain radiating down to left foot about 1 month ago, was seen at [facility] ED, MRI done revealing nerve impingement at the lumbar level.  Pt states she received and epidural injection with some relief of pain.  Pt is scheduled for posterior L4-5 lumbar discectomy on 5/2/19.

## 2019-04-29 NOTE — H&P PST ADULT - NEGATIVE NEUROLOGICAL SYMPTOMS
no vertigo/no transient paralysis/no weakness/no loss of sensation/no headache/no generalized seizures

## 2019-04-29 NOTE — H&P PST ADULT - NEGATIVE MUSCULOSKELETAL SYMPTOMS
no arm pain R/no muscle cramps/no stiffness/no neck pain/no arm pain L/no leg pain R/no joint swelling/no myalgia/no arthralgia/no muscle weakness

## 2019-04-29 NOTE — H&P PST ADULT - NS PRO TALK SOMEONE YN
Message   Recorded as Task   Date: 10/31/2016 08:53 AM, Created By: [place]ma   Task Name: Miscellaneous   Assigned To: Shay Wheeler   Regarding Patient: Alyssa Gamble, Status: Active   CommentEllan Sayer - 31 Oct 2016 8:53 AM     TASK CREATED  Repeat labs reviewed - renal function stable since Jan 2016 - creatinine same range (1 7), urinalysis unremarkable, minimal proteinuria, PTH upper limit  Renal US showed atrophic right kidney, no masses or stones  I spoke with Mr Core about results  He has an appt to see Urologist soon  Plan to repeat a renal function panel, PTH and UPC in 6 months ([facility] please mail orders) and if stable follow up in one year    Thanks,    [address]       Lab slips mailed to the patient  AG      Active Problems    1  Abdominal distention (787 3) (R14 0)   2  Acromioclavicular joint separation (831 04) (S43 109A)   3  Acute renal failure (584 9) (N17 9)   4  Back pain (724 5) (M54 9)   5  Benign essential hypertension (401 1) (I10)   6  Benign prostatic hypertrophy without urinary obstruction (600 00) (N40 0)   7  Cellulitis of forearm (682 3) (L03 119)   8  Chronic cough (786 2) (R05)   9  Chronic kidney disease (CKD), stage III (moderate) (585 3) (N18 3)   10  Chronic pain of both knees (378 29,744 57) (M25 561,M25 562,G89 29)   11  Chronic pain of both shoulders (719 41,338 29) (M25 512,M25 511,G89 29)   12  Closed Fracture Of The Distal End Of The Radius (813 42)   13  Itching (698 9) (L29 9)   14  Joint pain, knee (719 46) (M25 569)   15  Kidney problem (593 9) (N28 9)   16  Left forearm pain (729 5) (M79 632)   17  Need for immunization against influenza (V04 81) (Z23)   18  Poison ivy dermatitis (692 6) (L23 7)   19  Primary localized osteoarthritis of both knees (715 16) (M17 0)   20  Primary osteoarthritis of left knee (715 16) (M17 12)   21  Pulmonary nodule seen on imaging study (793 11) (R91 1)   22  Renal function test abnormal (794 4) (R94 4)   23   Right knee pain (719 46) (M25 561)   24  Shortness of breath (786 05) (R06 02)   25  Shoulder joint pain, unspecified laterality    Current Meds   1  AmLODIPine Besylate 10 MG Oral Tablet; take 1 tablet by mouth once daily; Therapy: 06OYT3976 to (Evaluate:12Vms4856)  Requested for: 31FQK5274; Last   Rx:[id number] Ordered   2  Losartan Potassium 50 MG Oral Tablet; take 1 tablet by mouth once daily; Therapy: 87SCY1343 to (Evaluate:33Tuq2717)  Requested for: 05XTQ8333; Last   Rx:[id number] Ordered    Allergies    1  No Known Drug Allergies    2  No Known Environmental Allergies   3  No Known Food Allergies    Plan  Chronic kidney disease (CKD), stage III (moderate)    · (1) PTH N-TERMINAL (INTACT); Status:Active - Retrospective By Protocol Authorization; Requested for:01Apr2017;    · (1) RENAL FUNCTION PANEL; Status:Active - Retrospective By Protocol Authorization; Requested for:01Apr2017;    · (1) URINE PROTEIN CREATININE RATIO; Status:Active - Retrospective By Protocol  Authorization;  Requested for:01Apr2017;     Signatures   Electronically signed by : NEETU Villalpando ; Oct 31 2016 10:36AM EST no

## 2019-04-29 NOTE — H&P PST ADULT - NEGATIVE CARDIOVASCULAR SYMPTOMS
no peripheral edema/no claudication/no paroxysmal nocturnal dyspnea/no dyspnea on exertion/no chest pain/no orthopnea/no palpitations

## 2019-04-29 NOTE — H&P PST ADULT - NSICDXPROBLEM_GEN_ALL_CORE_FT
PROBLEM DIAGNOSES  Problem: Other intervertebral disc disorder of lumbar region  Assessment and Plan: Pt is scheduled for posterior L4-5 lumbar discectomy on 5/2/19.   Pre op instructions including chlorhexidine wash given and pt able to verbalize understanding.   OR booking notified of pt's penicillin allergy via fax.

## 2019-04-29 NOTE — H&P PST ADULT - LYMPHATIC
posterior cervical R/supraclavicular R/anterior cervical R/supraclavicular L/anterior cervical L/posterior cervical L

## 2019-04-29 NOTE — H&P PST ADULT - ATTENDING COMMENTS
Patient has a herniated disc at L4/5 not responsive to non-surgical treatment. She now has progressive weakness and intractable pain.  On exam, 4/5 LLE.  The nature of the planned surgery, the options available to the patient, the risks and possible complications of the surgery including but not limited to death, paralysis, nerve damage,  infection,  bleeding,  failure of the surgery to relieve her symptoms, the possibility of recurrent disc herniation, spinal instability or some other problem requiring further surgery in the future; the possibility of pulmonary and/or cardiac complications, DVT, pulmonary embolus, spinal fluid leakage, adjacent spinal  level deterioration etc. etc were discussed at length with the patient who understands and wishes to proceed.

## 2019-04-29 NOTE — H&P PST ADULT - NEGATIVE ENMT SYMPTOMS
no vertigo/no hearing difficulty/no nasal congestion/no tinnitus/no sinus symptoms/no ear pain/no dysphagia

## 2019-04-30 PROBLEM — Z78.9 OTHER SPECIFIED HEALTH STATUS: Chronic | Status: INACTIVE | Noted: 2019-04-12 | Resolved: 2019-04-29

## 2019-04-30 LAB — SPECIMEN SOURCE: SIGNIFICANT CHANGE UP

## 2019-05-02 ENCOUNTER — APPOINTMENT (OUTPATIENT)
Dept: ORTHOPEDIC SURGERY | Facility: HOSPITAL | Age: 64
End: 2019-05-02
Payer: COMMERCIAL

## 2019-05-02 ENCOUNTER — INPATIENT (INPATIENT)
Facility: HOSPITAL | Age: 64
LOS: 3 days | Discharge: HOME | End: 2019-05-06
Attending: STUDENT IN AN ORGANIZED HEALTH CARE EDUCATION/TRAINING PROGRAM | Admitting: STUDENT IN AN ORGANIZED HEALTH CARE EDUCATION/TRAINING PROGRAM
Payer: COMMERCIAL

## 2019-05-02 ENCOUNTER — TRANSCRIPTION ENCOUNTER (OUTPATIENT)
Age: 64
End: 2019-05-02

## 2019-05-02 ENCOUNTER — RESULT REVIEW (OUTPATIENT)
Age: 64
End: 2019-05-02

## 2019-05-02 ENCOUNTER — CHART COPY (OUTPATIENT)
Age: 64
End: 2019-05-02

## 2019-05-02 VITALS
HEIGHT: 60.5 IN | RESPIRATION RATE: 18 BRPM | SYSTOLIC BLOOD PRESSURE: 110 MMHG | DIASTOLIC BLOOD PRESSURE: 68 MMHG | OXYGEN SATURATION: 100 % | TEMPERATURE: 98 F | WEIGHT: 126.1 LBS | HEART RATE: 74 BPM

## 2019-05-02 DIAGNOSIS — M51.26 OTHER INTERVERTEBRAL DISC DISPLACEMENT, LUMBAR REGION: ICD-10-CM

## 2019-05-02 DIAGNOSIS — Z98.891 HISTORY OF UTERINE SCAR FROM PREVIOUS SURGERY: Chronic | ICD-10-CM

## 2019-05-02 LAB
BACTERIA NPH CULT: SIGNIFICANT CHANGE UP
RH IG SCN BLD-IMP: POSITIVE — SIGNIFICANT CHANGE UP

## 2019-05-02 PROCEDURE — 63047 LAM FACETEC & FORAMOT LUMBAR: CPT

## 2019-05-02 PROCEDURE — ZZZZZ: CPT

## 2019-05-02 PROCEDURE — 63048 LAM FACETEC &FORAMOT EA ADDL: CPT

## 2019-05-02 PROCEDURE — 63030 LAMOT DCMPRN NRV RT 1 LMBR: CPT | Mod: 59

## 2019-05-02 RX ORDER — HYDROMORPHONE HYDROCHLORIDE 2 MG/ML
1 INJECTION INTRAMUSCULAR; INTRAVENOUS; SUBCUTANEOUS ONCE
Qty: 0 | Refills: 0 | Status: DISCONTINUED | OUTPATIENT
Start: 2019-05-02 | End: 2019-05-02

## 2019-05-02 RX ADMIN — HYDROMORPHONE HYDROCHLORIDE 1 MILLIGRAM(S): 2 INJECTION INTRAMUSCULAR; INTRAVENOUS; SUBCUTANEOUS at 17:55

## 2019-05-02 RX ADMIN — HYDROMORPHONE HYDROCHLORIDE 1 MILLIGRAM(S): 2 INJECTION INTRAMUSCULAR; INTRAVENOUS; SUBCUTANEOUS at 18:10

## 2019-05-03 DIAGNOSIS — M51.26 OTHER INTERVERTEBRAL DISC DISPLACEMENT, LUMBAR REGION: ICD-10-CM

## 2019-05-03 DIAGNOSIS — I95.9 HYPOTENSION, UNSPECIFIED: ICD-10-CM

## 2019-05-03 LAB
ANION GAP SERPL CALC-SCNC: 10 MMO/L — SIGNIFICANT CHANGE UP (ref 7–14)
BASOPHILS # BLD AUTO: 0.01 K/UL — SIGNIFICANT CHANGE UP (ref 0–0.2)
BASOPHILS NFR BLD AUTO: 0.1 % — SIGNIFICANT CHANGE UP (ref 0–2)
BUN SERPL-MCNC: 15 MG/DL — SIGNIFICANT CHANGE UP (ref 7–23)
CALCIUM SERPL-MCNC: 9.4 MG/DL — SIGNIFICANT CHANGE UP (ref 8.4–10.5)
CHLORIDE SERPL-SCNC: 99 MMOL/L — SIGNIFICANT CHANGE UP (ref 98–107)
CO2 SERPL-SCNC: 29 MMOL/L — SIGNIFICANT CHANGE UP (ref 22–31)
CREAT SERPL-MCNC: 0.73 MG/DL — SIGNIFICANT CHANGE UP (ref 0.5–1.3)
EOSINOPHIL # BLD AUTO: 0 K/UL — SIGNIFICANT CHANGE UP (ref 0–0.5)
EOSINOPHIL NFR BLD AUTO: 0 % — SIGNIFICANT CHANGE UP (ref 0–6)
GLUCOSE SERPL-MCNC: 146 MG/DL — HIGH (ref 70–99)
HCT VFR BLD CALC: 37.9 % — SIGNIFICANT CHANGE UP (ref 34.5–45)
HGB BLD-MCNC: 13 G/DL — SIGNIFICANT CHANGE UP (ref 11.5–15.5)
IMM GRANULOCYTES NFR BLD AUTO: 0.4 % — SIGNIFICANT CHANGE UP (ref 0–1.5)
LYMPHOCYTES # BLD AUTO: 0.45 K/UL — LOW (ref 1–3.3)
LYMPHOCYTES # BLD AUTO: 6.6 % — LOW (ref 13–44)
MCHC RBC-ENTMCNC: 30.1 PG — SIGNIFICANT CHANGE UP (ref 27–34)
MCHC RBC-ENTMCNC: 34.3 % — SIGNIFICANT CHANGE UP (ref 32–36)
MCV RBC AUTO: 87.7 FL — SIGNIFICANT CHANGE UP (ref 80–100)
MONOCYTES # BLD AUTO: 0.31 K/UL — SIGNIFICANT CHANGE UP (ref 0–0.9)
MONOCYTES NFR BLD AUTO: 4.5 % — SIGNIFICANT CHANGE UP (ref 2–14)
NEUTROPHILS # BLD AUTO: 6.07 K/UL — SIGNIFICANT CHANGE UP (ref 1.8–7.4)
NEUTROPHILS NFR BLD AUTO: 88.4 % — HIGH (ref 43–77)
NRBC # FLD: 0 K/UL — SIGNIFICANT CHANGE UP (ref 0–0)
PLATELET # BLD AUTO: 185 K/UL — SIGNIFICANT CHANGE UP (ref 150–400)
PMV BLD: 10.7 FL — SIGNIFICANT CHANGE UP (ref 7–13)
POTASSIUM SERPL-MCNC: 4.2 MMOL/L — SIGNIFICANT CHANGE UP (ref 3.5–5.3)
POTASSIUM SERPL-SCNC: 4.2 MMOL/L — SIGNIFICANT CHANGE UP (ref 3.5–5.3)
RBC # BLD: 4.32 M/UL — SIGNIFICANT CHANGE UP (ref 3.8–5.2)
RBC # FLD: 12.6 % — SIGNIFICANT CHANGE UP (ref 10.3–14.5)
SODIUM SERPL-SCNC: 138 MMOL/L — SIGNIFICANT CHANGE UP (ref 135–145)
WBC # BLD: 6.87 K/UL — SIGNIFICANT CHANGE UP (ref 3.8–10.5)
WBC # FLD AUTO: 6.87 K/UL — SIGNIFICANT CHANGE UP (ref 3.8–10.5)

## 2019-05-03 PROCEDURE — 63030 LAMOT DCMPRN NRV RT 1 LMBR: CPT | Mod: 59

## 2019-05-03 PROCEDURE — 63047 LAM FACETEC & FORAMOT LUMBAR: CPT

## 2019-05-03 PROCEDURE — 99223 1ST HOSP IP/OBS HIGH 75: CPT | Mod: AI

## 2019-05-03 PROCEDURE — 63048 LAM FACETEC &FORAMOT EA ADDL: CPT

## 2019-05-03 PROCEDURE — 88311 DECALCIFY TISSUE: CPT | Mod: 26

## 2019-05-03 PROCEDURE — 88304 TISSUE EXAM BY PATHOLOGIST: CPT | Mod: 26

## 2019-05-03 RX ORDER — MAGNESIUM HYDROXIDE 400 MG/1
30 TABLET, CHEWABLE ORAL EVERY 12 HOURS
Qty: 0 | Refills: 0 | Status: DISCONTINUED | OUTPATIENT
Start: 2019-05-03 | End: 2019-05-06

## 2019-05-03 RX ORDER — POLYETHYLENE GLYCOL 3350 17 G/17G
17 POWDER, FOR SOLUTION ORAL DAILY
Qty: 0 | Refills: 0 | Status: DISCONTINUED | OUTPATIENT
Start: 2019-05-03 | End: 2019-05-06

## 2019-05-03 RX ORDER — ACETAMINOPHEN 500 MG
650 TABLET ORAL EVERY 6 HOURS
Qty: 0 | Refills: 0 | Status: DISCONTINUED | OUTPATIENT
Start: 2019-05-03 | End: 2019-05-03

## 2019-05-03 RX ORDER — DOCUSATE SODIUM 100 MG
100 CAPSULE ORAL THREE TIMES A DAY
Qty: 0 | Refills: 0 | Status: DISCONTINUED | OUTPATIENT
Start: 2019-05-03 | End: 2019-05-06

## 2019-05-03 RX ORDER — ACETAMINOPHEN 500 MG
650 TABLET ORAL EVERY 6 HOURS
Qty: 0 | Refills: 0 | Status: COMPLETED | OUTPATIENT
Start: 2019-05-03 | End: 2019-05-06

## 2019-05-03 RX ORDER — SODIUM CHLORIDE 9 MG/ML
1000 INJECTION, SOLUTION INTRAVENOUS ONCE
Qty: 0 | Refills: 0 | Status: COMPLETED | OUTPATIENT
Start: 2019-05-03 | End: 2019-05-03

## 2019-05-03 RX ORDER — SODIUM CHLORIDE 9 MG/ML
1000 INJECTION, SOLUTION INTRAVENOUS
Qty: 0 | Refills: 0 | Status: DISCONTINUED | OUTPATIENT
Start: 2019-05-03 | End: 2019-05-03

## 2019-05-03 RX ORDER — OXYCODONE HYDROCHLORIDE 5 MG/1
10 TABLET ORAL EVERY 4 HOURS
Qty: 0 | Refills: 0 | Status: DISCONTINUED | OUTPATIENT
Start: 2019-05-03 | End: 2019-05-06

## 2019-05-03 RX ORDER — INFLUENZA VIRUS VACCINE 15; 15; 15; 15 UG/.5ML; UG/.5ML; UG/.5ML; UG/.5ML
0.5 SUSPENSION INTRAMUSCULAR ONCE
Qty: 0 | Refills: 0 | Status: DISCONTINUED | OUTPATIENT
Start: 2019-05-03 | End: 2019-05-06

## 2019-05-03 RX ORDER — ONDANSETRON 8 MG/1
4 TABLET, FILM COATED ORAL EVERY 6 HOURS
Qty: 0 | Refills: 0 | Status: DISCONTINUED | OUTPATIENT
Start: 2019-05-03 | End: 2019-05-06

## 2019-05-03 RX ORDER — PANTOPRAZOLE SODIUM 20 MG/1
40 TABLET, DELAYED RELEASE ORAL DAILY
Qty: 0 | Refills: 0 | Status: DISCONTINUED | OUTPATIENT
Start: 2019-05-03 | End: 2019-05-06

## 2019-05-03 RX ORDER — HYDROMORPHONE HYDROCHLORIDE 2 MG/ML
0.4 INJECTION INTRAMUSCULAR; INTRAVENOUS; SUBCUTANEOUS
Qty: 0 | Refills: 0 | Status: DISCONTINUED | OUTPATIENT
Start: 2019-05-03 | End: 2019-05-03

## 2019-05-03 RX ORDER — SENNA PLUS 8.6 MG/1
2 TABLET ORAL AT BEDTIME
Qty: 0 | Refills: 0 | Status: DISCONTINUED | OUTPATIENT
Start: 2019-05-03 | End: 2019-05-06

## 2019-05-03 RX ORDER — DIAZEPAM 5 MG
5 TABLET ORAL EVERY 12 HOURS
Qty: 0 | Refills: 0 | Status: DISCONTINUED | OUTPATIENT
Start: 2019-05-03 | End: 2019-05-04

## 2019-05-03 RX ORDER — OXYCODONE HYDROCHLORIDE 5 MG/1
5 TABLET ORAL EVERY 4 HOURS
Qty: 0 | Refills: 0 | Status: DISCONTINUED | OUTPATIENT
Start: 2019-05-03 | End: 2019-05-06

## 2019-05-03 RX ORDER — OXYCODONE HYDROCHLORIDE 5 MG/1
2.5 TABLET ORAL EVERY 4 HOURS
Qty: 0 | Refills: 0 | Status: DISCONTINUED | OUTPATIENT
Start: 2019-05-03 | End: 2019-05-06

## 2019-05-03 RX ADMIN — SODIUM CHLORIDE 1000 MILLILITER(S): 9 INJECTION, SOLUTION INTRAVENOUS at 14:51

## 2019-05-03 RX ADMIN — PANTOPRAZOLE SODIUM 40 MILLIGRAM(S): 20 TABLET, DELAYED RELEASE ORAL at 12:20

## 2019-05-03 RX ADMIN — SENNA PLUS 2 TABLET(S): 8.6 TABLET ORAL at 22:05

## 2019-05-03 RX ADMIN — Medication 100 MILLIGRAM(S): at 14:38

## 2019-05-03 RX ADMIN — Medication 100 MILLIGRAM(S): at 05:36

## 2019-05-03 RX ADMIN — Medication 100 MILLIGRAM(S): at 14:37

## 2019-05-03 RX ADMIN — SODIUM CHLORIDE 100 MILLILITER(S): 9 INJECTION, SOLUTION INTRAVENOUS at 07:14

## 2019-05-03 RX ADMIN — Medication 650 MILLIGRAM(S): at 19:30

## 2019-05-03 RX ADMIN — HYDROMORPHONE HYDROCHLORIDE 0.4 MILLIGRAM(S): 2 INJECTION INTRAMUSCULAR; INTRAVENOUS; SUBCUTANEOUS at 04:07

## 2019-05-03 RX ADMIN — Medication 100 MILLIGRAM(S): at 06:36

## 2019-05-03 RX ADMIN — OXYCODONE HYDROCHLORIDE 10 MILLIGRAM(S): 5 TABLET ORAL at 09:54

## 2019-05-03 RX ADMIN — Medication 5 MILLIGRAM(S): at 15:31

## 2019-05-03 RX ADMIN — OXYCODONE HYDROCHLORIDE 5 MILLIGRAM(S): 5 TABLET ORAL at 06:15

## 2019-05-03 RX ADMIN — Medication 650 MILLIGRAM(S): at 12:20

## 2019-05-03 RX ADMIN — HYDROMORPHONE HYDROCHLORIDE 0.4 MILLIGRAM(S): 2 INJECTION INTRAMUSCULAR; INTRAVENOUS; SUBCUTANEOUS at 02:36

## 2019-05-03 RX ADMIN — OXYCODONE HYDROCHLORIDE 2.5 MILLIGRAM(S): 5 TABLET ORAL at 14:37

## 2019-05-03 RX ADMIN — Medication 5 MILLIGRAM(S): at 02:35

## 2019-05-03 RX ADMIN — POLYETHYLENE GLYCOL 3350 17 GRAM(S): 17 POWDER, FOR SOLUTION ORAL at 12:20

## 2019-05-03 RX ADMIN — OXYCODONE HYDROCHLORIDE 2.5 MILLIGRAM(S): 5 TABLET ORAL at 15:43

## 2019-05-03 RX ADMIN — OXYCODONE HYDROCHLORIDE 10 MILLIGRAM(S): 5 TABLET ORAL at 20:21

## 2019-05-03 RX ADMIN — Medication 100 MILLIGRAM(S): at 22:05

## 2019-05-03 RX ADMIN — OXYCODONE HYDROCHLORIDE 10 MILLIGRAM(S): 5 TABLET ORAL at 10:40

## 2019-05-03 RX ADMIN — OXYCODONE HYDROCHLORIDE 10 MILLIGRAM(S): 5 TABLET ORAL at 19:31

## 2019-05-03 RX ADMIN — OXYCODONE HYDROCHLORIDE 5 MILLIGRAM(S): 5 TABLET ORAL at 05:36

## 2019-05-03 NOTE — CONSULT NOTE ADULT - SUBJECTIVE AND OBJECTIVE BOX
Patient is a 64y old  Female who presents with a chief complaint of     HPI:   64 y.o. Female w/ no PMHx p/w sudden onset intractable left buttock pain radiating down left foot x 1 month found on MRI to have lumbar nerve impingement not relieved with epidural injection now s/p posterior L4-5 lumbar discectomy on 19.             Pain Symptoms if applicable:             	                         none	   mild         moderate         severe  Pain:	                            0	    1-3	     4-6	         7-10  Location:	  Modifying factors:	  Associated symptoms:	    Function: [ ] Independent  [ ] Assistance  [ ] Total care  [ ] Non-ambulatory    Allergies    penicillin (Unknown)    Intolerances        HOME MEDICATIONS: [x ] Reviewed  senna oral tablet: Last Dose Taken:  , 2 tab(s) orally once a day (at bedtime), As Needed  oxyCODONE 5 mg oral tablet: Last Dose Taken:  , 1 tab(s) orally every 4 hours, As needed, Moderate Pain (4 - 6) MDD:8  diazePAM 5 mg oral tablet: Last Dose Taken:  , 1 tab(s) orally every 12 hours, As needed, Anxiety MDD:2  Tylenol 500 mg oral tablet: Last Dose Taken:  , 2 tab(s) orally every 6 hours, As Needed    MEDICATIONS  (STANDING):  acetaminophen   Tablet .. 650 milliGRAM(s) Oral every 6 hours  clindamycin IVPB 900 milliGRAM(s) IV Intermittent every 8 hours  docusate sodium 100 milliGRAM(s) Oral three times a day  influenza   Vaccine 0.5 milliLiter(s) IntraMuscular once  lactated ringers. 1000 milliLiter(s) (100 mL/Hr) IV Continuous <Continuous>  pantoprazole    Tablet 40 milliGRAM(s) Oral daily  polyethylene glycol 3350 17 Gram(s) Oral daily  senna 2 Tablet(s) Oral at bedtime    MEDICATIONS  (PRN):  diazepam    Tablet 5 milliGRAM(s) Oral every 12 hours PRN Muscle Spasm  magnesium hydroxide Suspension 30 milliLiter(s) Oral every 12 hours PRN Constipation  ondansetron Injectable 4 milliGRAM(s) IV Push every 6 hours PRN Nausea  oxyCODONE    IR 5 milliGRAM(s) Oral every 4 hours PRN Moderate Pain (4 - 6)  oxyCODONE    IR 10 milliGRAM(s) Oral every 4 hours PRN Severe Pain (7 - 10)  oxyCODONE    IR 2.5 milliGRAM(s) Oral every 4 hours PRN Mild Pain (1 - 3)      PAST MEDICAL & SURGICAL HISTORY:  Other intervertebral disc displacement, lumbar region  S/P : , , and   [x ] Reviewed     SOCIAL HISTORY:  Residence: [ ] [facility]  [ ] SNF  [x ] Community  [x ] Substance abuse: none  [x ] Tobacco: 1 PPD x 10 years quit in   [ x] Alcohol use: 1-2 drink Beer qmonthly    FAMILY HISTORY:  FH: lung cancer: father  FH: heart disease: mother      REVIEW OF SYSTEMS:    CONSTITUTIONAL: No fever, weight loss, or fatigue  EYES: No eye pain, visual disturbances, or discharge  ENMT:  No difficulty hearing, tinnitus, vertigo; No sinus or throat pain  NECK: No pain or stiffness  BREASTS: No pain, masses, or nipple discharge  RESPIRATORY: No cough, wheezing, chills or hemoptysis; No shortness of breath  CARDIOVASCULAR: No chest pain, palpitations, dizziness, or leg swelling  GASTROINTESTINAL: No abdominal or epigastric pain. No nausea, vomiting, or hematemesis; No diarrhea or constipation. No melena or hematochezia.  GENITOURINARY: No dysuria, frequency, hematuria, or incontinence  NEUROLOGICAL: No headaches, memory loss, loss of strength, numbness, or tremors  SKIN: No itching, burning, rashes, or lesions   LYMPH NODES: No enlarged glands  ENDOCRINE: No heat or cold intolerance; No hair loss  MUSCULOSKELETAL: No muscle or back pain  PSYCHIATRIC: No depression, anxiety, mood swings, or difficulty sleeping  HEME/LYMPH: No easy bruising, or bleeding gums  ALLERGY AND IMMUNOLOGIC: No hives or eczema    [ x ] All other ROS negative  [  ] Unable to obtain due to poor mental status    Vital Signs Last 24 Hrs  T(C): 36.7 (03 May 2019 09:49), Max: 36.7 (03 May 2019 04:53)  T(F): 98 (03 May 2019 09:49), Max: 98 (03 May 2019 04:53)  HR: 87 (03 May 2019 09:49) (66 - 98)  BP: 105/50 (03 May 2019 09:49) (91/48 - 126/76)  BP(mean): 59 (03 May 2019 03:45) (58 - 102)  RR: 19 (03 May 2019 09:49) (9 - 21)  SpO2: 100% (03 May 2019 09:49) (93% - 100%)    PHYSICAL EXAM:    GENERAL: NAD, well-groomed, well-developed  HEAD:  Atraumatic, Normocephalic  EYES: EOMI, PERRLA, conjunctiva and sclera clear  ENMT: Moist mucous membranes  NECK: Supple, No JVD  RESPIRATORY: Clear to auscultation bilaterally; No rales, rhonchi, wheezing, or rubs  CARDIOVASCULAR: Regular rate and rhythm; No murmurs, rubs, or gallops  GASTROINTESTINAL: Soft, Nontender, Nondistended; Bowel sounds present  GENITOURINARY: Not examined  EXTREMITIES:  2+ Peripheral Pulses, No clubbing, cyanosis, or edema  NERVOUS SYSTEM:  Alert & Oriented X3; Moving all 4 extremities; No gross sensory deficits  HEME/LYMPH: No lymphadenopathy noted  SKIN: No rashes or lesions; Incisions C/D/I    LABS:              CAPILLARY BLOOD GLUCOSE          RADIOLOGY & ADDITIONAL STUDIES:    EKG:   Personally Reviewed:  [ ] YES     Imaging: < from: Xray Lumbar Spine AP + Lateral (19 @ 23:29) >  IMPRESSION:  One image demonstrates distal tip of a surgical clamp projecting over the   L4-L5 interspinous space. Another image demonstrates the distal tip of a   surgical clamp projecting over the L5 spinous process.    Multilevel degenerative disc changes also apparent.    Correlate with intraoperative findings.    < end of copied text >    Personally Reviewed:  [x ] YES               Consultant(s) notes reviewed:    Care Discussed with Consultant(s)/Other Providers: ortho Patient is a 64y old  Female who presents with a chief complaint of     HPI:   64 y.o. Female w/ Hx chronic Hypotension ( SBPs normally in 90s) Chronic LBP p/w intractable left buttock pain radiating down left foot x 1 month  found on MRI to have lumbar nerve impingement not relieved with epidural injection now s/p posterior L4-5 lumbar discectomy on 19. Patient says pain down left foot has improved post op. Patient has no new complaints. Denies cp, SOB, abdominal pain, N/V/D             Pain Symptoms if applicable:             	                         none	   mild         moderate         severe  Pain: 5	                            0	    1-3	     4-6	         7-10  Location: lower back	  Modifying factors: movement	  Associated symptoms:	    Function: [x ] Independent  [ ] Assistance  [ ] Total care  [ ] Non-ambulatory    Allergies    penicillin (Unknown)    Intolerances        HOME MEDICATIONS: [x ] Reviewed  senna oral tablet: Last Dose Taken:  , 2 tab(s) orally once a day (at bedtime), As Needed  oxyCODONE 5 mg oral tablet: Last Dose Taken:  , 1 tab(s) orally every 4 hours, As needed, Moderate Pain (4 - 6) MDD:8  diazePAM 5 mg oral tablet: Last Dose Taken:  , 1 tab(s) orally every 12 hours, As needed, spasms  Tylenol 500 mg oral tablet: Last Dose Taken:  , 2 tab(s) orally every 6 hours, As Needed    MEDICATIONS  (STANDING):  acetaminophen   Tablet .. 650 milliGRAM(s) Oral every 6 hours  clindamycin IVPB 900 milliGRAM(s) IV Intermittent every 8 hours  docusate sodium 100 milliGRAM(s) Oral three times a day  influenza   Vaccine 0.5 milliLiter(s) IntraMuscular once  lactated ringers. 1000 milliLiter(s) (100 mL/Hr) IV Continuous <Continuous>  pantoprazole    Tablet 40 milliGRAM(s) Oral daily  polyethylene glycol 3350 17 Gram(s) Oral daily  senna 2 Tablet(s) Oral at bedtime    MEDICATIONS  (PRN):  diazepam    Tablet 5 milliGRAM(s) Oral every 12 hours PRN Muscle Spasm  magnesium hydroxide Suspension 30 milliLiter(s) Oral every 12 hours PRN Constipation  ondansetron Injectable 4 milliGRAM(s) IV Push every 6 hours PRN Nausea  oxyCODONE    IR 5 milliGRAM(s) Oral every 4 hours PRN Moderate Pain (4 - 6)  oxyCODONE    IR 10 milliGRAM(s) Oral every 4 hours PRN Severe Pain (7 - 10)  oxyCODONE    IR 2.5 milliGRAM(s) Oral every 4 hours PRN Mild Pain (1 - 3)      PAST MEDICAL & SURGICAL HISTORY:  Other intervertebral disc displacement, lumbar region  S/P : , , and   [x ] Reviewed     SOCIAL HISTORY:  Residence: [ ] [facility]  [ ] SNF  [x ] Community  [x ] Substance abuse: none  [x ] Tobacco: 1 PPD x 10 years quit in   [ x] Alcohol use: 1-2 drink Beer qmonthly    FAMILY HISTORY:  FH: lung cancer: father  FH: heart disease: mother      REVIEW OF SYSTEMS:    CONSTITUTIONAL: No fever, weight loss, or fatigue  EYES: No eye pain, visual disturbances, or discharge  ENMT:  No difficulty hearing, tinnitus, vertigo; No sinus or throat pain  NECK: No pain or stiffness  BREASTS: No pain, masses, or nipple discharge  RESPIRATORY: No cough, wheezing, chills or hemoptysis; No shortness of breath  CARDIOVASCULAR: No chest pain, palpitations, dizziness, or leg swelling  GASTROINTESTINAL: No abdominal or epigastric pain. No nausea, vomiting, or hematemesis; No diarrhea or constipation. No melena or hematochezia.  GENITOURINARY: No dysuria, frequency, hematuria, or incontinence  NEUROLOGICAL: No headaches, memory loss, loss of strength, numbness, or tremors  SKIN: No itching, burning, rashes, or lesions   LYMPH NODES: No enlarged glands  ENDOCRINE: No heat or cold intolerance; No hair loss  MUSCULOSKELETAL: back pain  PSYCHIATRIC: No depression, anxiety, mood swings, or difficulty sleeping  HEME/LYMPH: No easy bruising, or bleeding gums  ALLERGY AND IMMUNOLOGIC: No hives or eczema    [ x ] All other ROS negative  [  ] Unable to obtain due to poor mental status    Vital Signs Last 24 Hrs  T(C): 36.7 (03 May 2019 09:49), Max: 36.7 (03 May 2019 04:53)  T(F): 98 (03 May 2019 09:49), Max: 98 (03 May 2019 04:53)  HR: 87 (03 May 2019 09:49) (66 - 98)  BP: 105/50 (03 May 2019 09:49) (91/48 - 126/76)  BP(mean): 59 (03 May 2019 03:45) (58 - 102)  RR: 19 (03 May 2019 09:49) (9 - 21)  SpO2: 100% (03 May 2019 09:49) (93% - 100%)    PHYSICAL EXAM:    GENERAL: NAD, well-groomed, well-developed  HEAD:  Atraumatic, Normocephalic  EYES: EOMI, PERRLA, conjunctiva and sclera clear  ENMT: Moist mucous membranes  NECK: Supple, No JVD  RESPIRATORY: Clear to auscultation bilaterally; No rales, rhonchi, wheezing, or rubs  CARDIOVASCULAR: Regular rate and rhythm; No murmurs, rubs, or gallops  GASTROINTESTINAL: Soft, Nontender, Nondistended; Bowel sounds present  GENITOURINARY: Not examined  EXTREMITIES:  2+ Peripheral Pulses, No clubbing, cyanosis, or edema  NERVOUS SYSTEM:  Alert & Oriented X3; Moving all 4 extremities; No gross sensory deficits  HEME/LYMPH: No lymphadenopathy noted  SKIN: No rashes or lesions; Incisions C/D/I    LABS:              CAPILLARY BLOOD GLUCOSE          RADIOLOGY & ADDITIONAL STUDIES:    EKG:   Personally Reviewed:  [ ] YES     Imaging: < from: Xray Lumbar Spine AP + Lateral (19 @ 23:29) >  IMPRESSION:  One image demonstrates distal tip of a surgical clamp projecting over the   L4-L5 interspinous space. Another image demonstrates the distal tip of a   surgical clamp projecting over the L5 spinous process.    Multilevel degenerative disc changes also apparent.    Correlate with intraoperative findings.    < end of copied text >    Personally Reviewed:  [x ] YES               Consultant(s) notes reviewed:    Care Discussed with Consultant(s)/Other Providers: ortho

## 2019-05-03 NOTE — CHART NOTE - NSCHARTNOTEFT_GEN_A_CORE
Orthopaedic Surgery Progress Note    Subjective:   Patient seen and examined  Mild nausea, no vomiting, no CP, no SOB, pain controlled    Objective:  T(C): 36.6 (05-02-19 @ 15:49), Max: 36.6 (05-02-19 @ 15:49)  HR: 69 (05-03-19 @ 01:15) (69 - 98)  BP: 110/52 (05-03-19 @ 01:15) (110/52 - 118/97)  RR: 9 (05-03-19 @ 01:15) (9 - 21)  SpO2: 100% (05-03-19 @ 01:15) (95% - 100%)  Wt(kg): --      PE    NAD  Back:   dressing C/D/I, mild appropriate franko-incisional ttp  HMV in place w/ serosanguinous output  Neuro:  RLE IP 5/5 HS 5/5 Q 5/5 GS 5/5 TA 5/5 EHL 5/5   SILT L2-S1  LLE IP 5/5 HS 5/5 Q 5/5 GS 5/5 TA 5/5 EHL 5/5  SILT L2-S1, mild paresthesias L4/5  WWP BLE    64y Female s/p L4-5 discectomy POD 0  - Pain control  - FU labs  - WBAT  - PT/OT/OOB  - I/S  - Monitor HMV output  - SCDs  - Dispo planning

## 2019-05-03 NOTE — CHART NOTE - NSCHARTNOTEFT_GEN_A_CORE
order received to provide LSO following spinal surgery  Pt measured and fit while standing with rolling walker and assistx1.  LSO applied and adjusted  Pt and spouse instructed to don and donadira  Written instructions left at bedside  Follow up if needed      Tremaine lopez [place]  [employer]

## 2019-05-03 NOTE — OCCUPATIONAL THERAPY INITIAL EVALUATION ADULT - PLANNED THERAPY INTERVENTIONS, OT EVAL
neuromuscular re-education/transfer training/bed mobility training/ADL retraining/balance training/strengthening

## 2019-05-03 NOTE — PROGRESS NOTE ADULT - SUBJECTIVE AND OBJECTIVE BOX
No acute events overnight. Pain well controlled with pain medications. Left leg pain greatly improved compared to preop.    Vital Signs Last 24 Hrs  T(C): 36.7 (03 May 2019 04:53), Max: 36.7 (03 May 2019 04:53)  T(F): 98 (03 May 2019 04:53), Max: 98 (03 May 2019 04:53)  HR: 76 (03 May 2019 04:53) (66 - 98)  BP: 99/50 (03 May 2019 04:53) (91/48 - 126/76)  BP(mean): 59 (03 May 2019 03:45) (58 - 102)  RR: 14 (03 May 2019 04:53) (9 - 21)  SpO2: 94% (03 May 2019 04:53) (93% - 100%)      Exam:  Gen: NAD  RLE: 5/5 TA/GS/EHL/Q/IP, SILT  LLE: 4+/5 TA/GS/EHL/Q/IP (exam limited by pain), SILT  Dressing: Clean, Dry, Intact  HV: 15/15cc    A/P: 64 year old F s/p L4-5 discectomy POD#1    - Pain Control  - Regular Diet  - Monitor HV output  - Monitor labs  - Venodynes  - PT/OT, OOB  - Discharge Planning

## 2019-05-03 NOTE — CONSULT NOTE ADULT - ASSESSMENT
64 y.o. Female w/ Hx chronic Hypotension ( SBPs normally in 90s) Chronic LBP p/w intractable left buttock pain radiating down left foot x 1 month  found on MRI to have lumbar nerve impingement not relieved with epidural injection now s/p posterior L4-5 lumbar discectomy on 5/2/19.

## 2019-05-03 NOTE — PROVIDER CONTACT NOTE (OTHER) - BACKGROUND
Pt admitted on 5/3/19 for L4-5 decompression and disectomy. Patients BP's have been running in the high 90's to low 100's since on 9 [facility]

## 2019-05-03 NOTE — CONSULT NOTE ADULT - PROBLEM SELECTOR RECOMMENDATION 9
s/p posterior L4-5 lumbar discectomy on 5/2/19.  management as per ortho  pain control with Tylenol/ Oxy IR/ valium  encouraged incentive spirometry and PT  check labs.

## 2019-05-03 NOTE — PATIENT PROFILE ADULT - ABILITY TO HEAR (WITH HEARING AID OR HEARING APPLIANCE IF NORMALLY USED):
Surgery Postoperative Note    Doing well. Tolerating diet. Having regular Bowel movements. Pain controlled without narcotics.    Abdomen Soft Non-Tender entire abdomen No hernias palpated. Incision-Healing well     A/P  Cher Oakes is recovering well from a Laparoscopic Appendectomy. I advised her to slowly return to regular activity. I expect she will make a complete recovery without issues.    Thank you for the opportunity to help in her care.    Aidan Hollingsworth M.D.  [facility]  [phone]    Please route or send letter to:  Primary Care Provider (PCP) and Referring Provider  
Adequate: hears normal conversation without difficulty

## 2019-05-03 NOTE — PHYSICAL THERAPY INITIAL EVALUATION ADULT - CRITERIA FOR SKILLED THERAPEUTIC INTERVENTIONS
impairments found/therapy frequency/anticipated discharge recommendation/rehab potential/predicted duration of therapy intervention

## 2019-05-03 NOTE — PHYSICAL THERAPY INITIAL EVALUATION ADULT - GENERAL OBSERVATIONS, REHAB EVAL
Consult received, chart reviewed. Patient received supine in bed, NAD, + hemovac, family present. Patient agreed to Evaluation from Physical Therapist.

## 2019-05-03 NOTE — PHYSICAL THERAPY INITIAL EVALUATION ADULT - ADDITIONAL COMMENTS
Pt. was left sitting in chair post PT evaluation /63, NAD, call bell within reach, all lines intact, + hemovac, family present. RN Jami was made aware of pt. status and participation in PT.

## 2019-05-04 ENCOUNTER — TRANSCRIPTION ENCOUNTER (OUTPATIENT)
Age: 64
End: 2019-05-04

## 2019-05-04 DIAGNOSIS — Z98.890 OTHER SPECIFIED POSTPROCEDURAL STATES: ICD-10-CM

## 2019-05-04 LAB
ANION GAP SERPL CALC-SCNC: 10 MMO/L — SIGNIFICANT CHANGE UP (ref 7–14)
BASOPHILS # BLD AUTO: 0.01 K/UL — SIGNIFICANT CHANGE UP (ref 0–0.2)
BASOPHILS NFR BLD AUTO: 0.2 % — SIGNIFICANT CHANGE UP (ref 0–2)
BUN SERPL-MCNC: 15 MG/DL — SIGNIFICANT CHANGE UP (ref 7–23)
CALCIUM SERPL-MCNC: 9.3 MG/DL — SIGNIFICANT CHANGE UP (ref 8.4–10.5)
CHLORIDE SERPL-SCNC: 104 MMOL/L — SIGNIFICANT CHANGE UP (ref 98–107)
CO2 SERPL-SCNC: 28 MMOL/L — SIGNIFICANT CHANGE UP (ref 22–31)
CREAT SERPL-MCNC: 0.76 MG/DL — SIGNIFICANT CHANGE UP (ref 0.5–1.3)
EOSINOPHIL # BLD AUTO: 0.02 K/UL — SIGNIFICANT CHANGE UP (ref 0–0.5)
EOSINOPHIL NFR BLD AUTO: 0.3 % — SIGNIFICANT CHANGE UP (ref 0–6)
GLUCOSE SERPL-MCNC: 105 MG/DL — HIGH (ref 70–99)
HCT VFR BLD CALC: 35.4 % — SIGNIFICANT CHANGE UP (ref 34.5–45)
HGB BLD-MCNC: 11.9 G/DL — SIGNIFICANT CHANGE UP (ref 11.5–15.5)
IMM GRANULOCYTES NFR BLD AUTO: 0.2 % — SIGNIFICANT CHANGE UP (ref 0–1.5)
LYMPHOCYTES # BLD AUTO: 1.82 K/UL — SIGNIFICANT CHANGE UP (ref 1–3.3)
LYMPHOCYTES # BLD AUTO: 27.7 % — SIGNIFICANT CHANGE UP (ref 13–44)
MCHC RBC-ENTMCNC: 30.5 PG — SIGNIFICANT CHANGE UP (ref 27–34)
MCHC RBC-ENTMCNC: 33.6 % — SIGNIFICANT CHANGE UP (ref 32–36)
MCV RBC AUTO: 90.8 FL — SIGNIFICANT CHANGE UP (ref 80–100)
MONOCYTES # BLD AUTO: 0.62 K/UL — SIGNIFICANT CHANGE UP (ref 0–0.9)
MONOCYTES NFR BLD AUTO: 9.4 % — SIGNIFICANT CHANGE UP (ref 2–14)
NEUTROPHILS # BLD AUTO: 4.09 K/UL — SIGNIFICANT CHANGE UP (ref 1.8–7.4)
NEUTROPHILS NFR BLD AUTO: 62.2 % — SIGNIFICANT CHANGE UP (ref 43–77)
NRBC # FLD: 0 K/UL — SIGNIFICANT CHANGE UP (ref 0–0)
PLATELET # BLD AUTO: 168 K/UL — SIGNIFICANT CHANGE UP (ref 150–400)
PMV BLD: 11.3 FL — SIGNIFICANT CHANGE UP (ref 7–13)
POTASSIUM SERPL-MCNC: 3.7 MMOL/L — SIGNIFICANT CHANGE UP (ref 3.5–5.3)
POTASSIUM SERPL-SCNC: 3.7 MMOL/L — SIGNIFICANT CHANGE UP (ref 3.5–5.3)
RBC # BLD: 3.9 M/UL — SIGNIFICANT CHANGE UP (ref 3.8–5.2)
RBC # FLD: 13.2 % — SIGNIFICANT CHANGE UP (ref 10.3–14.5)
SODIUM SERPL-SCNC: 142 MMOL/L — SIGNIFICANT CHANGE UP (ref 135–145)
WBC # BLD: 6.57 K/UL — SIGNIFICANT CHANGE UP (ref 3.8–10.5)
WBC # FLD AUTO: 6.57 K/UL — SIGNIFICANT CHANGE UP (ref 3.8–10.5)

## 2019-05-04 PROCEDURE — 99233 SBSQ HOSP IP/OBS HIGH 50: CPT

## 2019-05-04 RX ORDER — DIAZEPAM 5 MG
5 TABLET ORAL EVERY 8 HOURS
Qty: 0 | Refills: 0 | Status: DISCONTINUED | OUTPATIENT
Start: 2019-05-04 | End: 2019-05-06

## 2019-05-04 RX ORDER — ACETAMINOPHEN 500 MG
2 TABLET ORAL
Qty: 0 | Refills: 0 | COMMUNITY

## 2019-05-04 RX ORDER — DOCUSATE SODIUM 100 MG
1 CAPSULE ORAL
Qty: 42 | Refills: 0 | OUTPATIENT
Start: 2019-05-04 | End: 2019-05-17

## 2019-05-04 RX ORDER — DIAZEPAM 5 MG
5 TABLET ORAL EVERY 8 HOURS
Qty: 0 | Refills: 0 | Status: DISCONTINUED | OUTPATIENT
Start: 2019-05-04 | End: 2019-05-04

## 2019-05-04 RX ADMIN — Medication 650 MILLIGRAM(S): at 00:37

## 2019-05-04 RX ADMIN — SENNA PLUS 2 TABLET(S): 8.6 TABLET ORAL at 22:10

## 2019-05-04 RX ADMIN — Medication 650 MILLIGRAM(S): at 06:23

## 2019-05-04 RX ADMIN — OXYCODONE HYDROCHLORIDE 5 MILLIGRAM(S): 5 TABLET ORAL at 22:40

## 2019-05-04 RX ADMIN — Medication 5 MILLIGRAM(S): at 22:11

## 2019-05-04 RX ADMIN — Medication 100 MILLIGRAM(S): at 22:10

## 2019-05-04 RX ADMIN — Medication 100 MILLIGRAM(S): at 06:23

## 2019-05-04 RX ADMIN — Medication 100 MILLIGRAM(S): at 13:52

## 2019-05-04 RX ADMIN — OXYCODONE HYDROCHLORIDE 5 MILLIGRAM(S): 5 TABLET ORAL at 22:10

## 2019-05-04 RX ADMIN — OXYCODONE HYDROCHLORIDE 10 MILLIGRAM(S): 5 TABLET ORAL at 04:10

## 2019-05-04 RX ADMIN — OXYCODONE HYDROCHLORIDE 10 MILLIGRAM(S): 5 TABLET ORAL at 10:25

## 2019-05-04 RX ADMIN — OXYCODONE HYDROCHLORIDE 10 MILLIGRAM(S): 5 TABLET ORAL at 18:05

## 2019-05-04 RX ADMIN — OXYCODONE HYDROCHLORIDE 10 MILLIGRAM(S): 5 TABLET ORAL at 04:59

## 2019-05-04 RX ADMIN — POLYETHYLENE GLYCOL 3350 17 GRAM(S): 17 POWDER, FOR SOLUTION ORAL at 12:41

## 2019-05-04 RX ADMIN — Medication 650 MILLIGRAM(S): at 12:41

## 2019-05-04 RX ADMIN — Medication 650 MILLIGRAM(S): at 18:04

## 2019-05-04 RX ADMIN — PANTOPRAZOLE SODIUM 40 MILLIGRAM(S): 20 TABLET, DELAYED RELEASE ORAL at 12:41

## 2019-05-04 RX ADMIN — OXYCODONE HYDROCHLORIDE 10 MILLIGRAM(S): 5 TABLET ORAL at 09:37

## 2019-05-04 RX ADMIN — OXYCODONE HYDROCHLORIDE 5 MILLIGRAM(S): 5 TABLET ORAL at 13:51

## 2019-05-04 RX ADMIN — OXYCODONE HYDROCHLORIDE 5 MILLIGRAM(S): 5 TABLET ORAL at 14:15

## 2019-05-04 NOTE — DISCHARGE NOTE PROVIDER - NSDCCPCAREPLAN_GEN_ALL_CORE_FT
PRINCIPAL DISCHARGE DIAGNOSIS  Diagnosis: Lumbar radiculopathy  Assessment and Plan of Treatment: PRINCIPAL DISCHARGE DIAGNOSIS  Diagnosis: Lumbar radiculopathy  Assessment and Plan of Treatment: WOUND CARE: Keep dressing/incision clean, dry, and intact.  BATHING: Please do not submerge wound underwater. You may shower and/or sponge bathe. Do not scrub incisions or apply lotions.  ACTIVITY: No heavy lifting or straining. Otherwise, you may return to your usual level of physical activity. If you are taking narcotic pain medication (such as Percocet), do NOT drive a car, operate machinery or make important decisions.  DIET: Return to your usual diet.  NOTIFY YOUR SURGEON IF: You have any bleeding that does not stop, any pus draining from your wound, any fever (over 100.4 F) or chills, persistent nausea/vomiting, persistent diarrhea, or if your pain is not controlled on your discharge pain medications.  PAIN CONTROL: Please take medication as prescribed. If you have been prescribed a narcotic (such as Percocet), please be aware that narcotic pain medicine can cause extreme nausea and constipation. Drink plenty of water and take diuretics (colace, Miralax) as needed. You can get them from your local pharmacy. If you have been prescribed a narcotic (such as Percocet), please take for severe pain only. You can take up to 8 Tylenol 325 mg a day while taking Percocet. Alternate between taking over the counter Ibuprofen and Tylenol so you are taking pain medication every 3-4 hours if your pain is severe.  FOLLOW-UP:  1. Follow-up with Dr. Tao within 1-2 weeks of discharge.  Please call office for appointment  2. Please follow up with your primary care physician within 2 weeks regarding your hospitalization. Call his/her office to make an appointment.

## 2019-05-04 NOTE — DISCHARGE NOTE PROVIDER - CARE PROVIDER_API CALL
Robert Tao)  Orthopaedic Surgery  [address], [address]  [place] [address]  Phone: [phone]  Fax: [phone]  Follow Up Time: Robert Tao)  Orthopaedic Surgery  [address], [address]  [place] [address]  Phone: [phone]  Fax: [phone]  Follow Up Time: 1 week

## 2019-05-04 NOTE — PROGRESS NOTE ADULT - SUBJECTIVE AND OBJECTIVE BOX
No acute events overnight. Pain well controlled with pain medications. Left leg pain greatly improved compared to preop.    Vital Signs Last 24 Hrs  T(C): 36.9 (04 May 2019 06:18), Max: 36.9 (03 May 2019 13:17)  T(F): 98.4 (04 May 2019 06:18), Max: 98.4 (03 May 2019 13:17)  HR: 75 (04 May 2019 06:18) (75 - 91)  BP: 96/62 (04 May 2019 06:18) (93/52 - 112/60)  BP(mean): --  RR: 17 (04 May 2019 06:18) (17 - 19)  SpO2: 98% (04 May 2019 06:18) (96% - 100%)      Exam:  Gen: NAD  RLE: 5/5 TA/GS/EHL/Q/IP, SILT  LLE: 4+/5 TA/GS/EHL/Q/IP (exam limited by pain), SILT  Dressing: Clean, Dry, Intact    A/P: 64 year old F s/p L4-5 discectomy POD#2    - Pain Control  - Regular Diet  - Monitor HV output  - Monitor labs  - Venodynes  - PT/OT, OOB  - Discharge Planning

## 2019-05-04 NOTE — DISCHARGE NOTE PROVIDER - HOSPITAL COURSE
64F with radiculopathy, admitted to the orthopedic department for elective surgery.  Patient medically optimized and underwent a L4-L5 discectomy by Dr Tao with no franko-op complications. Post op, patient put in a brace and made WBAT.  Patient doing well in PT, OT, pain controlled, labs and vitals stable, incision clean and dry.  As per Dr Tao, patient stable for discharge.  Keep surgical site clean and dry.  No heavy lifting, bending or twisting until further notice.  Do NOT take any blood thinners until further notified by attending (no Advil, motrin, aleve, aspirin, etc).  As per Dr Tao, patient to call office within 7-10 days to make follow up appointment and call PMD within 2 weeks for continuity of care as medications may have been adjusted during hospital stay.  Notify ortho with any questions or concerns. Patient is a 64F with radiculopathy, admitted to the orthopedic department for elective L4-5 lumbar discectomy with Dr. Tao on 5/2/2019. Post op, patient put in a brace and made WBAT.  Patient doing well in PT, OT, pain controlled, labs and vitals stable, incision clean and dry.  As per Dr Tao, patient stable for discharge.  Keep surgical site clean and dry.  No heavy lifting, bending or twisting until further notice.  Do NOT take any blood thinners until further notified by attending (no Advil, motrin, aleve, aspirin, etc).  As per Dr Tao, patient to call office within 7-10 days to make follow up appointment and call PMD within 2 weeks for continuity of care as medications may have been adjusted during hospital stay.  Notify ortho with any questions or concerns.

## 2019-05-04 NOTE — PROGRESS NOTE ADULT - ASSESSMENT
64 y.o. Female w/ Hx chronic Hypotension ( SBPs normally in 90s) Chronic LBP p/w intractable left buttock pain radiating down left foot x 1 month  found on MRI to have lumbar nerve impingement not relieved with epidural injection now s/p posterior L4-5 lumbar discectomy on 5/2/19 complicated by post-op pain control

## 2019-05-04 NOTE — PROGRESS NOTE ADULT - SUBJECTIVE AND OBJECTIVE BOX
CC: F/U for lumbar discectomy    SUBJECTIVE / OVERNIGHT EVENTS:  Patient is tearful and complaining of inadequate pain control.   Rates 10/10 in severity, spasms at the surgical site but also with radiculopathy similar to what she came in with, Pain med has not been effective since coming off the gtt.  No F/C, N/V, CP, SOB, Cough, lightheadedness, dizziness, abdominal pain, diarrhea, dysuria.      MEDICATIONS  (STANDING):  acetaminophen   Tablet .. 650 milliGRAM(s) Oral every 6 hours  diazepam    Tablet 5 milliGRAM(s) Oral every 8 hours  docusate sodium 100 milliGRAM(s) Oral three times a day  influenza   Vaccine 0.5 milliLiter(s) IntraMuscular once  pantoprazole    Tablet 40 milliGRAM(s) Oral daily  polyethylene glycol 3350 17 Gram(s) Oral daily  senna 2 Tablet(s) Oral at bedtime    MEDICATIONS  (PRN):  magnesium hydroxide Suspension 30 milliLiter(s) Oral every 12 hours PRN Constipation  ondansetron Injectable 4 milliGRAM(s) IV Push every 6 hours PRN Nausea  oxyCODONE    IR 5 milliGRAM(s) Oral every 4 hours PRN Moderate Pain (4 - 6)  oxyCODONE    IR 10 milliGRAM(s) Oral every 4 hours PRN Severe Pain (7 - 10)  oxyCODONE    IR 2.5 milliGRAM(s) Oral every 4 hours PRN Mild Pain (1 - 3)      Vital Signs Last 24 Hrs  T(C): 36.7 (04 May 2019 09:24), Max: 36.9 (03 May 2019 13:17)  T(F): 98.1 (04 May 2019 09:24), Max: 98.4 (03 May 2019 13:17)  HR: 94 (04 May 2019 09:24) (75 - 94)  BP: 117/59 (04 May 2019 09:24) (93/52 - 117/59)  BP(mean): --  RR: 22 (04 May 2019 09:24) (17 - 22)  SpO2: 100% (04 May 2019 09:24) (96% - 100%)  CAPILLARY BLOOD GLUCOSE        I&O's Summary    03 May 2019 07:01  -  04 May 2019 07:00  --------------------------------------------------------  IN: 0 mL / OUT: 2415 mL / NET: -2415 mL        PHYSICAL EXAM:  GENERAL: in mild distress due to back pain  HEAD:  Atraumatic, Normocephalic  EYES: EOMI, PERRLA, conjunctiva and sclera clear  NECK: Supple, No JVD  CHEST/LUNG: Clear to auscultation bilaterally; No wheeze  HEART: Regular rate and rhythm; No murmurs, rubs, or gallops  ABDOMEN: Soft, Nontender, Nondistended; Bowel sounds present  BACK: Limited ROM due to pain  EXTREMITIES:  2+ Peripheral Pulses, No clubbing, cyanosis.  PSYCH: Tearful  NEUROLOGY: AAOx3, non-focal neurological exam  SKIN: Surgical dressing C/D/I    LABS:                        11.9   6.57  )-----------( 168      ( 04 May 2019 05:24 )             35.4     05-04    142  |  104  |  15  ----------------------------<  105<H>  3.7   |  28  |  0.76    Ca    9.3      04 May 2019 05:24                RADIOLOGY & ADDITIONAL TESTS:    Imaging Personally Reviewed:    Care Discussed with Consultants/Other Providers:    Care Discussed with Orthopedic resident about: pain control

## 2019-05-04 NOTE — PROGRESS NOTE ADULT - PROBLEM SELECTOR PLAN 2
Difficulty with pain control - Valium and OxyIR PRN.  Will discuss with primary team about increasing dosing.  IPC for VTE prophylaxis.  Surgical site management as per ortho.

## 2019-05-04 NOTE — DISCHARGE NOTE PROVIDER - NSDCACTIVITY_GEN_ALL_CORE
Stairs allowed/Showering allowed/Do not make important decisions/Walking - Indoors allowed/Walking - Outdoors allowed/Do not drive or operate machinery/No heavy lifting/straining

## 2019-05-05 LAB
ANION GAP SERPL CALC-SCNC: 12 MMO/L — SIGNIFICANT CHANGE UP (ref 7–14)
BASOPHILS # BLD AUTO: 0.01 K/UL — SIGNIFICANT CHANGE UP (ref 0–0.2)
BASOPHILS NFR BLD AUTO: 0.2 % — SIGNIFICANT CHANGE UP (ref 0–2)
BUN SERPL-MCNC: 10 MG/DL — SIGNIFICANT CHANGE UP (ref 7–23)
CALCIUM SERPL-MCNC: 8.8 MG/DL — SIGNIFICANT CHANGE UP (ref 8.4–10.5)
CHLORIDE SERPL-SCNC: 103 MMOL/L — SIGNIFICANT CHANGE UP (ref 98–107)
CO2 SERPL-SCNC: 27 MMOL/L — SIGNIFICANT CHANGE UP (ref 22–31)
CREAT SERPL-MCNC: 0.66 MG/DL — SIGNIFICANT CHANGE UP (ref 0.5–1.3)
EOSINOPHIL # BLD AUTO: 0.01 K/UL — SIGNIFICANT CHANGE UP (ref 0–0.5)
EOSINOPHIL NFR BLD AUTO: 0.2 % — SIGNIFICANT CHANGE UP (ref 0–6)
GLUCOSE SERPL-MCNC: 113 MG/DL — HIGH (ref 70–99)
HCT VFR BLD CALC: 33.1 % — LOW (ref 34.5–45)
HGB BLD-MCNC: 11.1 G/DL — LOW (ref 11.5–15.5)
IMM GRANULOCYTES NFR BLD AUTO: 0.5 % — SIGNIFICANT CHANGE UP (ref 0–1.5)
LYMPHOCYTES # BLD AUTO: 1.73 K/UL — SIGNIFICANT CHANGE UP (ref 1–3.3)
LYMPHOCYTES # BLD AUTO: 29.4 % — SIGNIFICANT CHANGE UP (ref 13–44)
MCHC RBC-ENTMCNC: 30 PG — SIGNIFICANT CHANGE UP (ref 27–34)
MCHC RBC-ENTMCNC: 33.5 % — SIGNIFICANT CHANGE UP (ref 32–36)
MCV RBC AUTO: 89.5 FL — SIGNIFICANT CHANGE UP (ref 80–100)
MONOCYTES # BLD AUTO: 0.64 K/UL — SIGNIFICANT CHANGE UP (ref 0–0.9)
MONOCYTES NFR BLD AUTO: 10.9 % — SIGNIFICANT CHANGE UP (ref 2–14)
NEUTROPHILS # BLD AUTO: 3.47 K/UL — SIGNIFICANT CHANGE UP (ref 1.8–7.4)
NEUTROPHILS NFR BLD AUTO: 58.8 % — SIGNIFICANT CHANGE UP (ref 43–77)
NRBC # FLD: 0.02 K/UL — SIGNIFICANT CHANGE UP (ref 0–0)
PLATELET # BLD AUTO: 142 K/UL — LOW (ref 150–400)
PMV BLD: 11.1 FL — SIGNIFICANT CHANGE UP (ref 7–13)
POTASSIUM SERPL-MCNC: 3.7 MMOL/L — SIGNIFICANT CHANGE UP (ref 3.5–5.3)
POTASSIUM SERPL-SCNC: 3.7 MMOL/L — SIGNIFICANT CHANGE UP (ref 3.5–5.3)
RBC # BLD: 3.7 M/UL — LOW (ref 3.8–5.2)
RBC # FLD: 12.8 % — SIGNIFICANT CHANGE UP (ref 10.3–14.5)
SODIUM SERPL-SCNC: 142 MMOL/L — SIGNIFICANT CHANGE UP (ref 135–145)
WBC # BLD: 5.89 K/UL — SIGNIFICANT CHANGE UP (ref 3.8–10.5)
WBC # FLD AUTO: 5.89 K/UL — SIGNIFICANT CHANGE UP (ref 3.8–10.5)

## 2019-05-05 PROCEDURE — 99233 SBSQ HOSP IP/OBS HIGH 50: CPT | Mod: GC

## 2019-05-05 RX ORDER — ACETAMINOPHEN 500 MG
1000 TABLET ORAL ONCE
Qty: 0 | Refills: 0 | Status: COMPLETED | OUTPATIENT
Start: 2019-05-05 | End: 2019-05-05

## 2019-05-05 RX ADMIN — SENNA PLUS 2 TABLET(S): 8.6 TABLET ORAL at 21:11

## 2019-05-05 RX ADMIN — Medication 400 MILLIGRAM(S): at 00:45

## 2019-05-05 RX ADMIN — OXYCODONE HYDROCHLORIDE 5 MILLIGRAM(S): 5 TABLET ORAL at 15:30

## 2019-05-05 RX ADMIN — OXYCODONE HYDROCHLORIDE 10 MILLIGRAM(S): 5 TABLET ORAL at 04:57

## 2019-05-05 RX ADMIN — OXYCODONE HYDROCHLORIDE 5 MILLIGRAM(S): 5 TABLET ORAL at 14:28

## 2019-05-05 RX ADMIN — Medication 100 MILLIGRAM(S): at 06:32

## 2019-05-05 RX ADMIN — OXYCODONE HYDROCHLORIDE 10 MILLIGRAM(S): 5 TABLET ORAL at 10:26

## 2019-05-05 RX ADMIN — OXYCODONE HYDROCHLORIDE 10 MILLIGRAM(S): 5 TABLET ORAL at 11:30

## 2019-05-05 RX ADMIN — Medication 650 MILLIGRAM(S): at 12:14

## 2019-05-05 RX ADMIN — MAGNESIUM HYDROXIDE 30 MILLILITER(S): 400 TABLET, CHEWABLE ORAL at 17:57

## 2019-05-05 RX ADMIN — OXYCODONE HYDROCHLORIDE 10 MILLIGRAM(S): 5 TABLET ORAL at 04:27

## 2019-05-05 RX ADMIN — Medication 1000 MILLIGRAM(S): at 01:15

## 2019-05-05 RX ADMIN — Medication 100 MILLIGRAM(S): at 14:28

## 2019-05-05 RX ADMIN — Medication 650 MILLIGRAM(S): at 17:57

## 2019-05-05 RX ADMIN — Medication 100 MILLIGRAM(S): at 21:10

## 2019-05-05 RX ADMIN — OXYCODONE HYDROCHLORIDE 10 MILLIGRAM(S): 5 TABLET ORAL at 19:19

## 2019-05-05 RX ADMIN — Medication 400 MILLIGRAM(S): at 07:12

## 2019-05-05 RX ADMIN — POLYETHYLENE GLYCOL 3350 17 GRAM(S): 17 POWDER, FOR SOLUTION ORAL at 12:14

## 2019-05-05 RX ADMIN — PANTOPRAZOLE SODIUM 40 MILLIGRAM(S): 20 TABLET, DELAYED RELEASE ORAL at 12:14

## 2019-05-05 NOTE — PROGRESS NOTE ADULT - SUBJECTIVE AND OBJECTIVE BOX
CC: F/U for pain control    SUBJECTIVE / OVERNIGHT EVENTS:  Patient states that pain is markedly better than yesterday but her LLE neuropathy is still present post-op compared to pre-op.  No F/C, N/V, CP, SOB, Cough, lightheadedness, dizziness, abdominal pain, diarrhea, dysuria.    MEDICATIONS  (STANDING):  acetaminophen   Tablet .. 650 milliGRAM(s) Oral every 6 hours  docusate sodium 100 milliGRAM(s) Oral three times a day  influenza   Vaccine 0.5 milliLiter(s) IntraMuscular once  pantoprazole    Tablet 40 milliGRAM(s) Oral daily  polyethylene glycol 3350 17 Gram(s) Oral daily  senna 2 Tablet(s) Oral at bedtime    MEDICATIONS  (PRN):  diazepam    Tablet 5 milliGRAM(s) Oral every 8 hours PRN Muscle Spasm  magnesium hydroxide Suspension 30 milliLiter(s) Oral every 12 hours PRN Constipation  ondansetron Injectable 4 milliGRAM(s) IV Push every 6 hours PRN Nausea  oxyCODONE    IR 5 milliGRAM(s) Oral every 4 hours PRN Moderate Pain (4 - 6)  oxyCODONE    IR 10 milliGRAM(s) Oral every 4 hours PRN Severe Pain (7 - 10)  oxyCODONE    IR 2.5 milliGRAM(s) Oral every 4 hours PRN Mild Pain (1 - 3)      Vital Signs Last 24 Hrs  T(C): 36.7 (05 May 2019 10:24), Max: 37.6 (04 May 2019 21:14)  T(F): 98 (05 May 2019 10:24), Max: 99.7 (04 May 2019 21:14)  HR: 95 (05 May 2019 10:24) (71 - 98)  BP: 103/66 (05 May 2019 10:24) (93/56 - 103/66)  BP(mean): --  RR: 18 (05 May 2019 10:24) (16 - 18)  SpO2: 99% (05 May 2019 10:24) (95% - 100%)  CAPILLARY BLOOD GLUCOSE        I&O's Summary    04 May 2019 07:01  -  05 May 2019 07:00  --------------------------------------------------------  IN: 0 mL / OUT: 500 mL / NET: -500 mL        PHYSICAL EXAM:  GENERAL: NAD  HEAD:  Atraumatic, Normocephalic  EYES: EOMI, PERRLA, conjunctiva and sclera clear  NECK: Supple, No JVD  CHEST/LUNG: Clear to auscultation bilaterally; No wheeze  HEART: Regular rate and rhythm; No murmurs, rubs, or gallops  ABDOMEN: Soft, Nontender, Nondistended; Bowel sounds present  BACK: Limited ROM due to pain  EXTREMITIES:  2+ Peripheral Pulses, No clubbing, cyanosis.  PSYCH: Calm  NEUROLOGY: AAOx3, non-focal neurological exam  SKIN: Surgical dressing C/D/I    LABS:                        11.1   5.89  )-----------( 142      ( 05 May 2019 05:53 )             33.1     05-05    142  |  103  |  10  ----------------------------<  113<H>  3.7   |  27  |  0.66    Ca    8.8      05 May 2019 05:53                RADIOLOGY & ADDITIONAL TESTS:    Imaging Personally Reviewed:    Care Discussed with Consultants/Other Providers:    Care Discussed with Orthopedic PA about:

## 2019-05-05 NOTE — PROGRESS NOTE ADULT - SUBJECTIVE AND OBJECTIVE BOX
No acute events overnight. Pain well controlled with pain medications. Left leg pain greatly improved compared to preop.    Vital Signs Last 24 Hrs  T(C): 37.2 (05 May 2019 00:36), Max: 37.6 (04 May 2019 21:14)  T(F): 99 (05 May 2019 00:36), Max: 99.7 (04 May 2019 21:14)  HR: 95 (05 May 2019 00:36) (71 - 98)  BP: 93/56 (05 May 2019 00:36) (93/56 - 117/59)  BP(mean): --  RR: 17 (05 May 2019 00:36) (17 - 22)  SpO2: 95% (05 May 2019 00:36) (95% - 100%)    Exam:  Gen: NAD  RLE: 5/5 TA/GS/EHL/Q/IP, SILT  LLE: 4+/5 TA/GS/EHL/Q/IP (exam limited by pain), SILT  Dressing: Clean, Dry, Intact    A/P: 64 year old F s/p L4-5 discectomy POD#3    - Pain Control  - Regular Diet  - Monitor HV output  - Monitor labs  - Venodynes  - PT/OT, OOB  - Discharge Planning

## 2019-05-05 NOTE — PROGRESS NOTE ADULT - PROBLEM SELECTOR PLAN 2
Difficulty with pain control - Valium and OxyIR PRN.    IPC for VTE prophylaxis.  Surgical site management as per ortho.

## 2019-05-06 ENCOUNTER — TRANSCRIPTION ENCOUNTER (OUTPATIENT)
Age: 64
End: 2019-05-06

## 2019-05-06 ENCOUNTER — INBOUND DOCUMENT (OUTPATIENT)
Age: 64
End: 2019-05-06

## 2019-05-06 VITALS
DIASTOLIC BLOOD PRESSURE: 51 MMHG | TEMPERATURE: 98 F | OXYGEN SATURATION: 95 % | HEART RATE: 97 BPM | SYSTOLIC BLOOD PRESSURE: 108 MMHG | RESPIRATION RATE: 16 BRPM

## 2019-05-06 PROCEDURE — 99233 SBSQ HOSP IP/OBS HIGH 50: CPT

## 2019-05-06 RX ORDER — TRAMADOL HYDROCHLORIDE 50 MG/1
50 TABLET ORAL EVERY 8 HOURS
Qty: 0 | Refills: 0 | Status: DISCONTINUED | OUTPATIENT
Start: 2019-05-06 | End: 2019-05-06

## 2019-05-06 RX ORDER — DEXAMETHASONE 0.5 MG/5ML
10 ELIXIR ORAL ONCE
Qty: 0 | Refills: 0 | Status: COMPLETED | OUTPATIENT
Start: 2019-05-06 | End: 2019-05-06

## 2019-05-06 RX ORDER — TRAMADOL HYDROCHLORIDE 50 MG/1
1 TABLET ORAL
Qty: 21 | Refills: 0 | OUTPATIENT
Start: 2019-05-06 | End: 2019-05-12

## 2019-05-06 RX ORDER — LACTULOSE 10 G/15ML
20 SOLUTION ORAL ONCE
Qty: 0 | Refills: 0 | Status: COMPLETED | OUTPATIENT
Start: 2019-05-06 | End: 2019-05-06

## 2019-05-06 RX ORDER — DIAZEPAM 5 MG
1 TABLET ORAL
Qty: 14 | Refills: 0 | OUTPATIENT
Start: 2019-05-06 | End: 2019-05-12

## 2019-05-06 RX ORDER — ACETAMINOPHEN 500 MG
650 TABLET ORAL EVERY 6 HOURS
Qty: 0 | Refills: 0 | Status: DISCONTINUED | OUTPATIENT
Start: 2019-05-06 | End: 2019-05-06

## 2019-05-06 RX ADMIN — Medication 650 MILLIGRAM(S): at 06:45

## 2019-05-06 RX ADMIN — OXYCODONE HYDROCHLORIDE 10 MILLIGRAM(S): 5 TABLET ORAL at 00:15

## 2019-05-06 RX ADMIN — TRAMADOL HYDROCHLORIDE 50 MILLIGRAM(S): 50 TABLET ORAL at 14:09

## 2019-05-06 RX ADMIN — OXYCODONE HYDROCHLORIDE 10 MILLIGRAM(S): 5 TABLET ORAL at 00:43

## 2019-05-06 RX ADMIN — Medication 100 MILLIGRAM(S): at 13:35

## 2019-05-06 RX ADMIN — PANTOPRAZOLE SODIUM 40 MILLIGRAM(S): 20 TABLET, DELAYED RELEASE ORAL at 12:34

## 2019-05-06 RX ADMIN — Medication 100 MILLIGRAM(S): at 06:44

## 2019-05-06 RX ADMIN — Medication 5 MILLIGRAM(S): at 13:35

## 2019-05-06 RX ADMIN — TRAMADOL HYDROCHLORIDE 50 MILLIGRAM(S): 50 TABLET ORAL at 14:50

## 2019-05-06 RX ADMIN — Medication 650 MILLIGRAM(S): at 00:30

## 2019-05-06 RX ADMIN — Medication 650 MILLIGRAM(S): at 19:39

## 2019-05-06 RX ADMIN — Medication 650 MILLIGRAM(S): at 00:12

## 2019-05-06 RX ADMIN — Medication 102 MILLIGRAM(S): at 11:06

## 2019-05-06 RX ADMIN — Medication 1 ENEMA: at 10:21

## 2019-05-06 RX ADMIN — Medication 650 MILLIGRAM(S): at 12:34

## 2019-05-06 RX ADMIN — LACTULOSE 20 GRAM(S): 10 SOLUTION ORAL at 10:21

## 2019-05-06 RX ADMIN — Medication 650 MILLIGRAM(S): at 06:44

## 2019-05-06 NOTE — PROGRESS NOTE ADULT - SUBJECTIVE AND OBJECTIVE BOX
Patient is a 64y old  Female who presents with a chief complaint of back surgery (05 May 2019 12:31)        SUBJECTIVE / OVERNIGHT EVENTS: Pt states she is having some mild left leg pain similar to pre-op (just less severe). However, this is mostly tolerable. The major symptom bothering her is constipation. +flatus this morning.      MEDICATIONS  (STANDING):  acetaminophen   Tablet .. 650 milliGRAM(s) Oral every 6 hours  docusate sodium 100 milliGRAM(s) Oral three times a day  influenza   Vaccine 0.5 milliLiter(s) IntraMuscular once  pantoprazole    Tablet 40 milliGRAM(s) Oral daily  senna 2 Tablet(s) Oral at bedtime    MEDICATIONS  (PRN):  diazepam    Tablet 5 milliGRAM(s) Oral every 8 hours PRN Muscle Spasm  magnesium hydroxide Suspension 30 milliLiter(s) Oral every 12 hours PRN Constipation  ondansetron Injectable 4 milliGRAM(s) IV Push every 6 hours PRN Nausea  oxyCODONE    IR 5 milliGRAM(s) Oral every 4 hours PRN Moderate Pain (4 - 6)  oxyCODONE    IR 10 milliGRAM(s) Oral every 4 hours PRN Severe Pain (7 - 10)  oxyCODONE    IR 2.5 milliGRAM(s) Oral every 4 hours PRN Mild Pain (1 - 3)      Vital Signs Last 24 Hrs  T(C): 36.3 (06 May 2019 09:58), Max: 37.2 (05 May 2019 20:45)  T(F): 97.3 (06 May 2019 09:58), Max: 99 (05 May 2019 20:45)  HR: 90 (06 May 2019 09:58) (82 - 98)  BP: 105/65 (06 May 2019 09:58) (102/51 - 119/61)  BP(mean): --  RR: 16 (06 May 2019 09:58) (16 - 16)  SpO2: 99% (06 May 2019 09:58) (97% - 100%)  CAPILLARY BLOOD GLUCOSE        I&O's Summary        PHYSICAL EXAM  GENERAL: NAD, well-developed  HEAD:  Atraumatic, Normocephalic  EYES: EOMI, PERRLA, conjunctiva and sclera clear  CHEST/LUNG: Clear to auscultation bilaterally; No wheeze  HEART: Regular rate and rhythm; No murmurs, rubs, or gallops  ABDOMEN: Soft, Nontender, Nondistended; Bowel sounds present  EXTREMITIES:  2+ Peripheral Pulses, No clubbing, cyanosis, or edema    LABS:                        11.1   5.89  )-----------( 142      ( 05 May 2019 05:53 )             33.1     05-05    142  |  103  |  10  ----------------------------<  113<H>  3.7   |  27  |  0.66    Ca    8.8      05 May 2019 05:53                  RADIOLOGY & ADDITIONAL TESTS:    Imaging Personally Reviewed:  Consultant(s) Notes Reviewed:    Care Discussed with Consultants/Other Providers:

## 2019-05-06 NOTE — DISCHARGE NOTE NURSING/CASE MANAGEMENT/SOCIAL WORK - NSDCPEWEB_GEN_ALL_CORE
[place] website --- [email or web address] for Tobacco Control website --- [email or web address]

## 2019-05-06 NOTE — DISCHARGE NOTE NURSING/CASE MANAGEMENT/SOCIAL WORK - NSDCPNINST_GEN_ALL_CORE
Call MD for any complain of numbness, tingling, swelling to all extremities, fever, redness or drainage to incision, and a return appointment.  Call office for a post op appointment.  Take stool softener to prevent constipation caused from taking pain medications.

## 2019-05-06 NOTE — DISCHARGE NOTE NURSING/CASE MANAGEMENT/SOCIAL WORK - NSDCPEEMAIL_GEN_ALL_CORE
[facility] for Tobacco Control email [email or web address]

## 2019-05-06 NOTE — DISCHARGE NOTE NURSING/CASE MANAGEMENT/SOCIAL WORK - NSDCDPATPORTLINK_GEN_ALL_CORE
You can access the [facility] Patient Portal, offered by [facility], by registering with the following website: [email or web address]

## 2019-05-06 NOTE — PROGRESS NOTE ADULT - SUBJECTIVE AND OBJECTIVE BOX
No acute events overnight. Left leg pain/sensitivity worsened overnight, c/o shooting L leg pain this morning, lateral calf and dorsum of foot    Vital Signs Last 24 Hrs  T(C): 36.6 (06 May 2019 06:41), Max: 37.2 (05 May 2019 20:45)  T(F): 97.9 (06 May 2019 06:41), Max: 99 (05 May 2019 20:45)  HR: 85 (06 May 2019 06:41) (82 - 98)  BP: 112/62 (06 May 2019 06:41) (102/51 - 119/61)  BP(mean): --  RR: 16 (06 May 2019 06:41) (16 - 18)  SpO2: 97% (06 May 2019 06:41) (97% - 100%)    Exam:  Gen: NAD  RLE: 5/5 TA/GS/EHL/Q/IP, SILT  LLE: 4+/5 TA/GS/EHL/Q/IP (exam limited by pain), SILT  Dressing: Clean, Dry, Intact    A/P: 64 year old F s/p L4-5 discectomy POD#4    - Pain Control, ? steroids today  - Regular Diet  - Monitor labs  - Venodynes  - PT/OT, OOB  - Discharge Planning

## 2019-05-06 NOTE — DISCHARGE NOTE NURSING/CASE MANAGEMENT/SOCIAL WORK - NSDCPNDISPN_GEN_ALL_CORE
Safe use, storage and disposal of opioids when prescribed/Education provided on the pain management plan of care/Side effects of pain management treatment

## 2019-05-07 LAB — SURGICAL PATHOLOGY STUDY: SIGNIFICANT CHANGE UP

## 2019-05-10 ENCOUNTER — CHART COPY (OUTPATIENT)
Age: 64
End: 2019-05-10

## 2019-05-15 ENCOUNTER — APPOINTMENT (OUTPATIENT)
Dept: ORTHOPEDIC SURGERY | Facility: CLINIC | Age: 64
End: 2019-05-15
Payer: COMMERCIAL

## 2019-05-15 PROCEDURE — 99024 POSTOP FOLLOW-UP VISIT: CPT

## 2019-05-15 NOTE — HISTORY OF PRESENT ILLNESS
[de-identified] : Ms. Lopes presents to the office s/p L4-5 discectomy on 5/2/19.   She has good and bad days related to her leg pain.  She will take gabapentin, flexeril. tylenol, and oxycodone for symptom control.

## 2019-05-15 NOTE — DISCUSSION/SUMMARY
[de-identified] : Overall her leg pain is improved compared to prior to surgery. She will continue with her recovery and medications. Followup in 3 weeks

## 2019-05-15 NOTE — PHYSICAL EXAM
[Walker] : ambulates with walker [de-identified] : Her incision is healing well. Stable neurologic exam.

## 2019-05-17 ENCOUNTER — CHART COPY (OUTPATIENT)
Age: 64
End: 2019-05-17

## 2019-05-21 ENCOUNTER — RX RENEWAL (OUTPATIENT)
Age: 64
End: 2019-05-21

## 2019-05-22 ENCOUNTER — CHART COPY (OUTPATIENT)
Age: 64
End: 2019-05-22

## 2019-06-10 ENCOUNTER — APPOINTMENT (OUTPATIENT)
Dept: ORTHOPEDIC SURGERY | Facility: CLINIC | Age: 64
End: 2019-06-10
Payer: COMMERCIAL

## 2019-06-10 PROCEDURE — 99024 POSTOP FOLLOW-UP VISIT: CPT

## 2019-06-10 NOTE — DISCUSSION/SUMMARY
[de-identified] : She continues to recover. She will try increasing her gabapentin. Followup in 6 weeks.

## 2019-06-10 NOTE — HISTORY OF PRESENT ILLNESS
[de-identified] : Ms. Lopes presents to the office s/p L4-5 discectomy on 5/2/19.   She has good and bad days related to her leg pain.  She will take gabapentin, flexeril. tylenol, and oxycodone for symptom control.

## 2019-06-10 NOTE — PHYSICAL EXAM
[Walker] : ambulates with walker [de-identified] : Her incision is healing well. Stable neurologic exam.

## 2019-06-19 ENCOUNTER — CHART COPY (OUTPATIENT)
Age: 64
End: 2019-06-19

## 2019-07-22 ENCOUNTER — APPOINTMENT (OUTPATIENT)
Dept: ORTHOPEDIC SURGERY | Facility: CLINIC | Age: 64
End: 2019-07-22
Payer: COMMERCIAL

## 2019-07-22 PROCEDURE — 99024 POSTOP FOLLOW-UP VISIT: CPT

## 2019-07-31 ENCOUNTER — FORM ENCOUNTER (OUTPATIENT)
Age: 64
End: 2019-07-31

## 2019-08-02 NOTE — HISTORY OF PRESENT ILLNESS
[de-identified] : Ms. Lopes presents to the office s/p L4-5 discectomy on 5/2/19.   She has good and bad days related to her leg pain.  She will take gabapentin, valium, and an nsaid  for symptom control. Her main complaint is left foot pain and sensitivity.  She can not wear a shoe.  She ambulates with a cane at home and walker when she goes to work.  She has been more active.  Her pain management doctor is recommended to do a nerve block.

## 2019-08-02 NOTE — DISCUSSION/SUMMARY
[de-identified] : We discussed further treatment options. She does report significant improvement in her radiculopathy. She appears to have some symptomatology consistent with chronic regional pain syndrome. She does report having a bone scan to confirm this. She will continue with pain management. Followup in 6-8 weeks' time or sooner with any changes or worsening of her symptoms.  Patient requires a handicap permit.

## 2019-09-16 ENCOUNTER — APPOINTMENT (OUTPATIENT)
Dept: ORTHOPEDIC SURGERY | Facility: CLINIC | Age: 64
End: 2019-09-16
Payer: COMMERCIAL

## 2019-09-16 PROCEDURE — 99214 OFFICE O/P EST MOD 30 MIN: CPT

## 2019-09-16 NOTE — PHYSICAL EXAM
[Cane] : ambulates with cane [de-identified] : Her incision is healed. Stable neurologic exam. [de-identified] : Review of a recent lumbar spine MRI reveals some epidural fibrosis. There is some persistent foraminal stenosis on the left at L4-5

## 2019-09-16 NOTE — DISCUSSION/SUMMARY
[de-identified] : We discussed further treatment options both nonsurgical and surgical. At this time she wishes to continue with nonsurgical treatment. She will be evaluated for possible selective nerve root blocks. She will follow up with me afterwards

## 2019-09-16 NOTE — HISTORY OF PRESENT ILLNESS
[de-identified] : Ms. Lopes presents to the office s/p L4-5 discectomy on 5/2/19.   Today she is complaining of left buttock pain and continues to have left foot sensitivity.  She will take gabapentin at night for symptom control. She can not wear a shoe.  She ambulates with a cane.

## 2019-10-18 ENCOUNTER — CHART COPY (OUTPATIENT)
Age: 64
End: 2019-10-18

## 2019-11-11 ENCOUNTER — APPOINTMENT (OUTPATIENT)
Dept: ORTHOPEDIC SURGERY | Facility: CLINIC | Age: 64
End: 2019-11-11
Payer: COMMERCIAL

## 2019-11-11 PROCEDURE — 99214 OFFICE O/P EST MOD 30 MIN: CPT

## 2019-11-11 NOTE — PHYSICAL EXAM
[Cane] : ambulates with cane [Antalgic] : not antalgic [de-identified] : Her incision is healed. Stable neurologic exam. [de-identified] : Review of a recent lumbar spine MRI reveals some epidural fibrosis. There is some persistent foraminal stenosis on the left at L4-5

## 2019-11-11 NOTE — DISCUSSION/SUMMARY
[de-identified] : Overall she is improving. She will continue with nonsurgical treatment. Followup in 3 months.

## 2019-11-11 NOTE — HISTORY OF PRESENT ILLNESS
[de-identified] : Ms. Lopes presents to the office s/p L4-5 discectomy on 5/2/19.  Overall she is feeling much better compared to last visit.   Her left foot is better and less sensitive.  She is able to wear a regular sneaker now.   She is off gabapentin.

## 2020-03-02 ENCOUNTER — APPOINTMENT (OUTPATIENT)
Dept: ORTHOPEDIC SURGERY | Facility: CLINIC | Age: 65
End: 2020-03-02
Payer: COMMERCIAL

## 2020-03-02 DIAGNOSIS — M51.26 OTHER INTERVERTEBRAL DISC DISPLACEMENT, LUMBAR REGION: ICD-10-CM

## 2020-03-02 DIAGNOSIS — M54.16 RADICULOPATHY, LUMBAR REGION: ICD-10-CM

## 2020-03-02 PROCEDURE — 99214 OFFICE O/P EST MOD 30 MIN: CPT

## 2020-03-02 NOTE — DISCUSSION/SUMMARY
[de-identified] : Symptomatology today appears more related to left knee pathology. She will follow up with one of my colleagues regarding this. Followup with me in 3-6 months.

## 2020-03-02 NOTE — HISTORY OF PRESENT ILLNESS
[de-identified] : Ms. Lopes presents to the office s/p L4-5 discectomy on 5/2/19.  Overall she is feeling much better compared to last visit.   Her left foot is better and less sensitive.  She is able to wear a regular sneaker and a shoe.   She is complaining of left knee pain and swelling.  Her knee feels heavy.  She has a soreness in her back by the end of the day.

## 2020-03-02 NOTE — PHYSICAL EXAM
[de-identified] : Her incision is healed. Stable neurologic exam. [Antalgic] : not antalgic [de-identified] : Review of a recent lumbar spine MRI reveals some epidural fibrosis. There is some persistent foraminal stenosis on the left at L4-5

## 2020-03-06 NOTE — OCCUPATIONAL THERAPY INITIAL EVALUATION ADULT - RANGE OF MOTION EXAMINATION, UPPER EXTREMITY
Detail Level: Zone Detail Level: Generalized bilateral UE Active ROM was WFL  (within functional limits)

## 2021-01-14 ENCOUNTER — NON-APPOINTMENT (OUTPATIENT)
Age: 66
End: 2021-01-14

## 2021-11-30 ENCOUNTER — TRANSCRIPTION ENCOUNTER (OUTPATIENT)
Age: 66
End: 2021-11-30

## 2021-12-18 ENCOUNTER — TRANSCRIPTION ENCOUNTER (OUTPATIENT)
Age: 66
End: 2021-12-18

## 2021-12-20 ENCOUNTER — TRANSCRIPTION ENCOUNTER (OUTPATIENT)
Age: 66
End: 2021-12-20

## 2022-01-14 ENCOUNTER — TRANSCRIPTION ENCOUNTER (OUTPATIENT)
Age: 67
End: 2022-01-14

## 2022-03-24 ENCOUNTER — TRANSCRIPTION ENCOUNTER (OUTPATIENT)
Age: 67
End: 2022-03-24

## 2022-03-30 ENCOUNTER — TRANSCRIPTION ENCOUNTER (OUTPATIENT)
Age: 67
End: 2022-03-30

## 2022-04-13 NOTE — PHYSICAL THERAPY INITIAL EVALUATION ADULT - PHYSICAL ASSIST/NONPHYSICAL ASSIST: SUPINE/SIT, REHAB EVAL
verbal cues/nonverbal cues (demo/gestures)/1 person assist Will agree to consider an appropriate level of outpatient care

## 2022-05-22 ENCOUNTER — NON-APPOINTMENT (OUTPATIENT)
Age: 67
End: 2022-05-22

## 2022-10-11 ENCOUNTER — APPOINTMENT (OUTPATIENT)
Dept: RHEUMATOLOGY | Facility: CLINIC | Age: 67
End: 2022-10-11

## 2022-11-04 NOTE — PHYSICAL THERAPY INITIAL EVALUATION ADULT - PLANNED THERAPY INTERVENTIONS, PT EVAL
Please see Norma Maguire message below:    I have a sore with red on my back. It's one of those hard smelly  lumps ,tried just once to  squeeze it to get it out . It's been like this for 5 days. Hot to touch. I've put nerosporn on it. What should I do. I do start radiation soon. Attach picture  Lauren Barnes  [phone]   Attachments  [id number]       Pt. Called stating she noticed this hard red bump about 5 days ago. She squeezed it and \"a tiny amount of white hard smelly stuff came out\". It is still red and hot. She has tried hot packs and cold packs with no help. Pt. Is concerned because she will be starting radiation next week for breast cancer. Asking what she should do? She can be reached at [phone]. strengthening/bed mobility training/transfer training/balance training/gait training

## 2022-12-30 ENCOUNTER — APPOINTMENT (OUTPATIENT)
Dept: PULMONOLOGY | Facility: CLINIC | Age: 67
End: 2022-12-30
Payer: COMMERCIAL

## 2022-12-30 VITALS
SYSTOLIC BLOOD PRESSURE: 120 MMHG | WEIGHT: 122 LBS | DIASTOLIC BLOOD PRESSURE: 70 MMHG | BODY MASS INDEX: 23.03 KG/M2 | RESPIRATION RATE: 16 BRPM | OXYGEN SATURATION: 98 % | TEMPERATURE: 97.4 F | HEART RATE: 70 BPM | HEIGHT: 61 IN

## 2022-12-30 DIAGNOSIS — Z87.891 PERSONAL HISTORY OF NICOTINE DEPENDENCE: ICD-10-CM

## 2022-12-30 DIAGNOSIS — R09.82 POSTNASAL DRIP: ICD-10-CM

## 2022-12-30 DIAGNOSIS — Z80.1 FAMILY HISTORY OF MALIGNANT NEOPLASM OF TRACHEA, BRONCHUS AND LUNG: ICD-10-CM

## 2022-12-30 DIAGNOSIS — R05.3 CHRONIC COUGH: ICD-10-CM

## 2022-12-30 PROCEDURE — 94727 GAS DIL/WSHOT DETER LNG VOL: CPT

## 2022-12-30 PROCEDURE — 94729 DIFFUSING CAPACITY: CPT

## 2022-12-30 PROCEDURE — 95012 NITRIC OXIDE EXP GAS DETER: CPT

## 2022-12-30 PROCEDURE — 71046 X-RAY EXAM CHEST 2 VIEWS: CPT

## 2022-12-30 PROCEDURE — 99204 OFFICE O/P NEW MOD 45 MIN: CPT | Mod: 25

## 2022-12-30 PROCEDURE — 94010 BREATHING CAPACITY TEST: CPT

## 2022-12-30 RX ORDER — OXYCODONE 5 MG/1
5 TABLET ORAL EVERY 4 HOURS
Qty: 40 | Refills: 0 | Status: DISCONTINUED | COMMUNITY
Start: 2019-04-15 | End: 2022-12-30

## 2022-12-30 RX ORDER — GABAPENTIN 300 MG/1
300 CAPSULE ORAL 3 TIMES DAILY
Qty: 270 | Refills: 0 | Status: DISCONTINUED | COMMUNITY
Start: 2019-06-10 | End: 2022-12-30

## 2022-12-30 RX ORDER — DIAZEPAM 5 MG/1
5 TABLET ORAL
Qty: 30 | Refills: 0 | Status: DISCONTINUED | COMMUNITY
Start: 2019-05-10 | End: 2022-12-30

## 2022-12-30 RX ORDER — OXYCODONE 5 MG/1
5 TABLET ORAL EVERY 4 HOURS
Qty: 40 | Refills: 0 | Status: DISCONTINUED | COMMUNITY
Start: 2019-05-10 | End: 2022-12-30

## 2022-12-30 RX ORDER — GABAPENTIN 100 MG/1
100 CAPSULE ORAL 3 TIMES DAILY
Qty: 270 | Refills: 0 | Status: DISCONTINUED | COMMUNITY
Start: 2019-05-22 | End: 2022-12-30

## 2022-12-30 RX ORDER — DOCUSATE SODIUM 100 MG/1
100 CAPSULE ORAL TWICE DAILY
Qty: 60 | Refills: 0 | Status: DISCONTINUED | COMMUNITY
Start: 2019-05-17 | End: 2022-12-30

## 2022-12-30 RX ORDER — DIAZEPAM 5 MG/1
5 TABLET ORAL
Qty: 30 | Refills: 0 | Status: DISCONTINUED | COMMUNITY
Start: 2019-05-17 | End: 2022-12-30

## 2022-12-30 RX ORDER — GABAPENTIN 100 MG/1
100 CAPSULE ORAL 3 TIMES DAILY
Qty: 60 | Refills: 0 | Status: DISCONTINUED | COMMUNITY
Start: 2019-05-17 | End: 2022-12-30

## 2022-12-30 RX ORDER — GABAPENTIN 100 MG/1
100 CAPSULE ORAL 3 TIMES DAILY
Qty: 60 | Refills: 0 | Status: DISCONTINUED | COMMUNITY
Start: 2019-05-10 | End: 2022-12-30

## 2022-12-30 RX ORDER — CYCLOBENZAPRINE HYDROCHLORIDE 10 MG/1
10 TABLET, FILM COATED ORAL EVERY 8 HOURS
Qty: 30 | Refills: 0 | Status: DISCONTINUED | COMMUNITY
Start: 2019-09-16 | End: 2022-12-30

## 2022-12-30 RX ORDER — OXYCODONE 5 MG/1
5 TABLET ORAL EVERY 4 HOURS
Qty: 60 | Refills: 0 | Status: DISCONTINUED | COMMUNITY
Start: 2019-05-17 | End: 2022-12-30

## 2022-12-30 RX ORDER — DIAZEPAM 5 MG/1
5 TABLET ORAL
Qty: 30 | Refills: 0 | Status: DISCONTINUED | COMMUNITY
Start: 2019-07-22 | End: 2022-12-30

## 2022-12-30 NOTE — HISTORY OF PRESENT ILLNESS
[Former] : former [< 20 pack-years] : < 20 pack-years [TextBox_4] : Ms. HERNANDEZ is a 67 year old, former smoking, female. She has past medical history of  x 3 & Back Surgery. She denies past medical history and is not on any daily medications at this time except for Z-Thaddeus. She presents for an initial pulmonary consult.\par \par Her chief concern is persistent cough x 8 days.\par \par She states she presented to Urgent Care on 22 with c/o cough, fatigue, & headaches. She tested negative for RSV, Flu, Covid-19, & Strep.  She states she was RX'ed Benzonatate, Azelastine, and Fluticasone. She states she felt no improvement and contacted her PCP, Dr. Robert. Her PCP Rx'ed Z-Thaddeus, which she is on day 4 of today.  She states starting to feel better yesterday.  She notes she is 60-70% improved.  She states cough at night persists as she feels "tickle in the throat worsens at nighttime" upon laying down.  She was using Tylenol PM.  She was awakening in the middle of the night with headache and is not sure if it was related to current infection or related to nasal sprays.  She has since discontinued nasal sprays and headaches have improved. \par \par She states that the "tickle in her throat" sensation has preceded this current infection. \par \par She notes not hydrating adequately. \par \par Covid-19 Vaccination x 3 (Pfizer - last dose 2021). \par \par She denies fevers, chills, body aches, chest tightness, wheezing, shortness of breath at rest, reflux, heartburn or any other issues at this time.\par  [TextBox_11] : 0.75 [TextBox_13] : 10 [YearQuit] : 2012

## 2022-12-30 NOTE — PROCEDURE
[FreeTextEntry1] : PFT's performed in office show \par FEV1: 151% \par FEV1/FVC Ratio: 77% \par SFL56-73%: 131% \par DLCO: 115% \par \par Feno: 27 ppb\par \par CXR in office; Read by Dr. Child. \par Impression: Normal appearing heart. Tracheal calcification appreciated. No infiltrates, effusion or opacities noted.\par \par

## 2022-12-30 NOTE — REVIEW OF SYSTEMS
[Postnasal Drip] : postnasal drip [Cough] : cough [Sputum] : sputum [SOB on Exertion] : sob on exertion [Negative] : Endocrine [Fatigue] : fatigue [Chest Tightness] : no chest tightness [Dyspnea] : no dyspnea [Wheezing] : no wheezing

## 2022-12-30 NOTE — ASSESSMENT
[FreeTextEntry1] : Ms. HERNANDEZ is a 67 year old, former smoking, female. She has past medical history of  x 3 & Back Surgery. She denies past medical history and is not on any daily medications at this time except for Z-Thaddeus. She presents for an initial pulmonary consult. Her chief concern is persistent cough x 8 days.\par \par 1. Cough, persistent:\par - likely r/t recent infection; improving with Antibiotic therapy.\par - likely r/t post nasal drip. \par - CXR in office WNL. \par \par 2. Post nasal drip:\par - Unable to tolerate nasal sprays.\par - Try adding Benadryl x 2 nights.\par - Increase hydration.\par - If persistent, ENT Evaluation. \par \par Patient to follow up PRN.\par Patient to call with further questions and concerns.\par Patient verbalizes understanding of care plan and is agreeable.\par

## 2023-02-13 NOTE — H&P ADULT - DOES THIS PATIENT HAVE A HISTORY OF OR HAS BEEN DX WITH HEART FAILURE?
LMTCB provider will be out of office appointment needs to be rescheduled. If patient calls back please assist with rescheduling. Okay per PCP to use reserved slot.   no

## 2023-03-14 ENCOUNTER — APPOINTMENT (OUTPATIENT)
Dept: SURGERY | Facility: CLINIC | Age: 68
End: 2023-03-14
Payer: COMMERCIAL

## 2023-03-14 VITALS
RESPIRATION RATE: 17 BRPM | BODY MASS INDEX: 23.22 KG/M2 | SYSTOLIC BLOOD PRESSURE: 106 MMHG | DIASTOLIC BLOOD PRESSURE: 75 MMHG | TEMPERATURE: 97.2 F | OXYGEN SATURATION: 98 % | HEART RATE: 69 BPM | WEIGHT: 123 LBS | HEIGHT: 61 IN

## 2023-03-14 DIAGNOSIS — K64.8 OTHER HEMORRHOIDS: ICD-10-CM

## 2023-03-14 DIAGNOSIS — Z85.820 PERSONAL HISTORY OF MALIGNANT MELANOMA OF SKIN: ICD-10-CM

## 2023-03-14 PROCEDURE — 99203 OFFICE O/P NEW LOW 30 MIN: CPT | Mod: 25

## 2023-03-14 PROCEDURE — 46600 DIAGNOSTIC ANOSCOPY SPX: CPT

## 2023-03-14 RX ORDER — BIOTIN 10 MG
TABLET ORAL
Refills: 0 | Status: ACTIVE | COMMUNITY

## 2023-03-14 RX ORDER — ASCORBIC ACID 500 MG
TABLET ORAL
Refills: 0 | Status: ACTIVE | COMMUNITY

## 2023-03-14 NOTE — ASSESSMENT
[FreeTextEntry1] : In summary the patient has new onset rectal bleeding with blood in the toilet paper when wiping.  This has been going on for couple of weeks.  She has had similar episodes in the past which have resolved.  She is due for a screening colonoscopy.  Examination reveals mild nonbleeding internal hemorrhoids.  I instructed her to follow-up with Dr. Jung for a colonoscopy to be sure there is no proximal source of bleeding.  If her colonoscopy is normal and her bleeding persists I would recommend rubber band ligations of her internal hemorrhoids.

## 2023-03-14 NOTE — PHYSICAL EXAM
[Normal Breath Sounds] : Normal breath sounds [Normal Heart Sounds] : normal heart sounds [No Rash or Lesion] : No rash or lesion [Alert] : alert [Oriented to Person] : oriented to person [Oriented to Place] : oriented to place [Oriented to Time] : oriented to time [Calm] : calm [Abdomen Tenderness] : ~T No ~M abdominal tenderness [No HSM] : no hepatosplenomegaly [de-identified] : Perianal inspection and digital rectal examination are normal.  There is no fissure fistula or abscess.  Anoscopy reveals normal distal rectal mucosa with mildly enlarged nonbleeding internal hemorrhoids.  There are several diminutive hypertrophied anal papillae [de-identified] : WNL [de-identified] : WNL  [de-identified] : JOSUÉL [de-identified] : WNL ROM  [de-identified] : WNL

## 2023-03-14 NOTE — HISTORY OF PRESENT ILLNESS
[FreeTextEntry1] : Tarsha is a 67 y/o female here for a consultation visit, rectal bleeding. \par \par Colonoscopy on 1/30/17 benign polyp.  Repeat colonoscopy recommended in 5 years\par \par Today pt reports no pain. Daily BMs, formed but sometimes loose, denies pain, with bleeding on tp for a week (has had bleeding before about an year ago for about two days), no episodes of incontinence, and denies feeling swollen or prolapsed tissue. Not taking any anticoagulants.

## 2023-03-14 NOTE — CONSULT LETTER
[Dear  ___] : Dear  [unfilled], [Consult Letter:] : I had the pleasure of evaluating your patient, [unfilled]. [Please see my note below.] : Please see my note below. [Consult Closing:] : Thank you very much for allowing me to participate in the care of this patient.  If you have any questions, please do not hesitate to contact me. [Sincerely,] : Sincerely, [FreeTextEntry3] : Danny Thompson M.D., F.A.C.S, F.A.S.C.R.S [DrAtilio  ___] : Dr. CLARK

## 2023-06-15 NOTE — ED CDU PROVIDER DISPOSITION NOTE - NS ED MD DISPO SPECIAL CONSIDERATION1
Called and spoke to patient  Let her know that [facility]  Fall Precaution

## 2023-12-03 ENCOUNTER — NON-APPOINTMENT (OUTPATIENT)
Age: 68
End: 2023-12-03

## 2023-12-11 ENCOUNTER — NON-APPOINTMENT (OUTPATIENT)
Age: 68
End: 2023-12-11

## 2024-12-02 NOTE — OCCUPATIONAL THERAPY INITIAL EVALUATION ADULT - PHYSICAL ASSIST/NONPHYSICAL ASSIST:DRESS LOWER BODY, OT EVAL
Adenike Smith MD [place], T Gi Nurse Msg Pool5 hours ago (7:38 AM)     TT  We can do the standard prep,thank you!      verbal cues/nonverbal cues (demo/gestures)/1 person assist

## 2024-12-27 ENCOUNTER — EMERGENCY (EMERGENCY)
Facility: HOSPITAL | Age: 69
LOS: 1 days | Discharge: HOME | End: 2024-12-27
Attending: STUDENT IN AN ORGANIZED HEALTH CARE EDUCATION/TRAINING PROGRAM
Payer: COMMERCIAL

## 2024-12-27 VITALS
TEMPERATURE: 98 F | OXYGEN SATURATION: 100 % | RESPIRATION RATE: 18 BRPM | DIASTOLIC BLOOD PRESSURE: 63 MMHG | HEART RATE: 711 BPM | SYSTOLIC BLOOD PRESSURE: 109 MMHG | WEIGHT: 121.92 LBS | HEIGHT: 61 IN

## 2024-12-27 VITALS
OXYGEN SATURATION: 99 % | HEART RATE: 68 BPM | DIASTOLIC BLOOD PRESSURE: 67 MMHG | SYSTOLIC BLOOD PRESSURE: 104 MMHG | RESPIRATION RATE: 16 BRPM

## 2024-12-27 DIAGNOSIS — Z98.891 HISTORY OF UTERINE SCAR FROM PREVIOUS SURGERY: Chronic | ICD-10-CM

## 2024-12-27 LAB
ALBUMIN SERPL ELPH-MCNC: 4.2 G/DL — SIGNIFICANT CHANGE UP (ref 3.3–5)
ALP SERPL-CCNC: 101 U/L — SIGNIFICANT CHANGE UP (ref 40–120)
ALT FLD-CCNC: 13 U/L — SIGNIFICANT CHANGE UP (ref 10–45)
ANION GAP SERPL CALC-SCNC: 11 MMOL/L — SIGNIFICANT CHANGE UP (ref 5–17)
APTT BLD: 29.5 SEC — SIGNIFICANT CHANGE UP (ref 24.5–35.6)
AST SERPL-CCNC: 17 U/L — SIGNIFICANT CHANGE UP (ref 10–40)
BILIRUB SERPL-MCNC: 0.7 MG/DL — SIGNIFICANT CHANGE UP (ref 0.2–1.2)
BLD GP AB SCN SERPL QL: NEGATIVE — SIGNIFICANT CHANGE UP
BUN SERPL-MCNC: 15 MG/DL — SIGNIFICANT CHANGE UP (ref 7–23)
CALCIUM SERPL-MCNC: 9.2 MG/DL — SIGNIFICANT CHANGE UP (ref 8.4–10.5)
CHLORIDE SERPL-SCNC: 101 MMOL/L — SIGNIFICANT CHANGE UP (ref 96–108)
CO2 SERPL-SCNC: 24 MMOL/L — SIGNIFICANT CHANGE UP (ref 22–31)
CREAT SERPL-MCNC: 0.69 MG/DL — SIGNIFICANT CHANGE UP (ref 0.5–1.3)
EGFR: 94 ML/MIN/1.73M2 — SIGNIFICANT CHANGE UP
GLUCOSE SERPL-MCNC: 100 MG/DL — HIGH (ref 70–99)
HCT VFR BLD CALC: 36.9 % — SIGNIFICANT CHANGE UP (ref 34.5–45)
HGB BLD-MCNC: 12.4 G/DL — SIGNIFICANT CHANGE UP (ref 11.5–15.5)
INR BLD: 1.11 RATIO — SIGNIFICANT CHANGE UP (ref 0.85–1.16)
MCHC RBC-ENTMCNC: 30.6 PG — SIGNIFICANT CHANGE UP (ref 27–34)
MCHC RBC-ENTMCNC: 33.6 G/DL — SIGNIFICANT CHANGE UP (ref 32–36)
MCV RBC AUTO: 91.1 FL — SIGNIFICANT CHANGE UP (ref 80–100)
NRBC # BLD: 0 /100 WBCS — SIGNIFICANT CHANGE UP (ref 0–0)
PLATELET # BLD AUTO: 242 K/UL — SIGNIFICANT CHANGE UP (ref 150–400)
POTASSIUM SERPL-MCNC: 4.2 MMOL/L — SIGNIFICANT CHANGE UP (ref 3.5–5.3)
POTASSIUM SERPL-SCNC: 4.2 MMOL/L — SIGNIFICANT CHANGE UP (ref 3.5–5.3)
PROT SERPL-MCNC: 7.3 G/DL — SIGNIFICANT CHANGE UP (ref 6–8.3)
PROTHROM AB SERPL-ACNC: 12.8 SEC — SIGNIFICANT CHANGE UP (ref 9.9–13.4)
RBC # BLD: 4.05 M/UL — SIGNIFICANT CHANGE UP (ref 3.8–5.2)
RBC # FLD: 12.1 % — SIGNIFICANT CHANGE UP (ref 10.3–14.5)
RH IG SCN BLD-IMP: POSITIVE — SIGNIFICANT CHANGE UP
SODIUM SERPL-SCNC: 136 MMOL/L — SIGNIFICANT CHANGE UP (ref 135–145)
WBC # BLD: 8.68 K/UL — SIGNIFICANT CHANGE UP (ref 3.8–10.5)
WBC # FLD AUTO: 8.68 K/UL — SIGNIFICANT CHANGE UP (ref 3.8–10.5)

## 2024-12-27 PROCEDURE — 73030 X-RAY EXAM OF SHOULDER: CPT

## 2024-12-27 PROCEDURE — 73060 X-RAY EXAM OF HUMERUS: CPT | Mod: 26,LT

## 2024-12-27 PROCEDURE — 72125 CT NECK SPINE W/O DYE: CPT | Mod: MC

## 2024-12-27 PROCEDURE — 86900 BLOOD TYPING SEROLOGIC ABO: CPT

## 2024-12-27 PROCEDURE — 99284 EMERGENCY DEPT VISIT MOD MDM: CPT

## 2024-12-27 PROCEDURE — 85610 PROTHROMBIN TIME: CPT

## 2024-12-27 PROCEDURE — 70450 CT HEAD/BRAIN W/O DYE: CPT | Mod: MC

## 2024-12-27 PROCEDURE — 72125 CT NECK SPINE W/O DYE: CPT | Mod: 26,MC

## 2024-12-27 PROCEDURE — 73080 X-RAY EXAM OF ELBOW: CPT

## 2024-12-27 PROCEDURE — 80053 COMPREHEN METABOLIC PANEL: CPT

## 2024-12-27 PROCEDURE — 99284 EMERGENCY DEPT VISIT MOD MDM: CPT | Mod: 25

## 2024-12-27 PROCEDURE — 70450 CT HEAD/BRAIN W/O DYE: CPT | Mod: 26,MC

## 2024-12-27 PROCEDURE — 73060 X-RAY EXAM OF HUMERUS: CPT

## 2024-12-27 PROCEDURE — 86901 BLOOD TYPING SEROLOGIC RH(D): CPT

## 2024-12-27 PROCEDURE — 73020 X-RAY EXAM OF SHOULDER: CPT

## 2024-12-27 PROCEDURE — 73080 X-RAY EXAM OF ELBOW: CPT | Mod: 26,LT

## 2024-12-27 PROCEDURE — 96374 THER/PROPH/DIAG INJ IV PUSH: CPT

## 2024-12-27 PROCEDURE — 73020 X-RAY EXAM OF SHOULDER: CPT | Mod: 26,LT,59

## 2024-12-27 PROCEDURE — 73030 X-RAY EXAM OF SHOULDER: CPT | Mod: 26,LT

## 2024-12-27 PROCEDURE — 85730 THROMBOPLASTIN TIME PARTIAL: CPT

## 2024-12-27 PROCEDURE — 85027 COMPLETE CBC AUTOMATED: CPT

## 2024-12-27 PROCEDURE — 86850 RBC ANTIBODY SCREEN: CPT

## 2024-12-27 RX ORDER — ACETAMINOPHEN 500MG 500 MG/1
1000 TABLET, COATED ORAL ONCE
Refills: 0 | Status: COMPLETED | OUTPATIENT
Start: 2024-12-27 | End: 2024-12-27

## 2024-12-27 RX ADMIN — ACETAMINOPHEN 500MG 400 MILLIGRAM(S): 500 TABLET, COATED ORAL at 12:58

## 2024-12-27 NOTE — ED PROVIDER NOTE - PHYSICAL EXAMINATION
Const: Awake, alert, no acute distress.  Well appearing.  Moving comfortably on stretcher.  HEENT: NC/AT.  Moist mucous membranes.  No pharyngeal erythema, no exudates.  Eyes: Extraocular movements intact b/l.  Conjunctiva pink.  No scleral icterus.  Neck: Neck supple, full ROM without pain.  Cardiac: Regular rate and regular rhythm. S1 S2 present.  Peripheral pulses 2+ and symmetric.   Resp: Speaking in full sentences. No evidence of respiratory distress.  Breath sounds clear to auscultation b/l. Normal chest excursion.   Abd: Non-distended, no overlying skin changes.  Soft, non-tender, no guarding, no rigidity, no rebound tenderness.  No palpable masses.   Back: Spine midline and non-tender. No CVAT.  MSK: decreased rom of left upper extremity w/ ttp over humerus and shoulder. Normal rom of lower extremities w/ 5/5 strength. Neurovascularly intact.   Skin: Normal coloration.  No rashes, abrasions or lacerations.  Neuro: Awake, alert & oriented x 3.  Moves all extremities spontaneously.  No focal deficits.

## 2024-12-27 NOTE — ED PROVIDER NOTE - AVIAN FLU SYMPTOMS
Bill Insurance (You Assume Risk Of Denial Or Audit By Selecting Yes): Yes
Post-Care Instructions: I reviewed with the patient in detail post-care instructions. Patient is to wear sunprotection, and avoid picking at any of the treated lesions. Pt may apply Vaseline to crusted or scabbing areas.
Medical Necessity Clause: This procedure was medically necessary because the lesions that were treated were:
Anesthesia Volume In Cc: 0.5
Render Post-Care Instructions In Note?: no
No
Consent: The patient's consent was obtained including but not limited to risks of crusting, scabbing, blistering, scarring, darker or lighter pigmentary change, recurrence, incomplete removal and infection.
Medical Necessity Information: It is in your best interest to select a reason for this procedure from the list below. All of these items fulfill various CMS LCD requirements except the new and changing color options.
Detail Level: Detailed

## 2024-12-27 NOTE — ED PROVIDER NOTE - CLINICAL SUMMARY MEDICAL DECISION MAKING FREE TEXT BOX
69-year-old female with no past medical history presents to the emergency department with left shoulder pain after a mechanical fall.  Patient says she fell last night in her bathroom when she slipped on the carpet.  She endorses hitting her head however denies LOC.  Patient went to urgent care where she was found to have a proximal humerus fracture.  Patient endorsing mild headache and nausea.  Patient denies pain anywhere else.  No chest pain, shortness of breath, back pain, neck pain.  Patient afebrile and HD stable.  Patient denies any antinausea medication at this time but is amenable to pain medication.  DDx includes ICH, fracture.  Dispo pending imaging and reassessment.

## 2024-12-27 NOTE — ED ADULT NURSE NOTE - NSFALLUNIVINTERV_ED_ALL_ED
Bed/Stretcher in lowest position, wheels locked, appropriate side rails in place/Call bell, personal items and telephone in reach/Instruct patient to call for assistance before getting out of bed/chair/stretcher/Non-slip footwear applied when patient is off stretcher/[place] to call system/Physically safe environment - no spills, clutter or unnecessary equipment/Purposeful proactive rounding/Room/bathroom lighting operational, light cord in reach

## 2024-12-27 NOTE — ED PROVIDER NOTE - NSFOLLOWUPCLINICS_GEN_ALL_ED_FT
[facility] Orthopedic Surgery  Orthopedic Surgery  [address], 3rd & [address]th [place] [address]  Phone: [phone]  Fax:     [facility]  Orthopedic Surgery  [address] [address]  Phone: [phone]  Fax:

## 2024-12-27 NOTE — ED PROVIDER NOTE - PATIENT PORTAL LINK FT
You can access the FollowMyHealth Patient Portal offered by [facility] by registering at the following website: [email or web address]. By joining [employer]’s FollowMyHealth portal, you will also be able to view your health information using other applications (apps) compatible with our system.

## 2024-12-27 NOTE — ED PROVIDER NOTE - NSFOLLOWUPINSTRUCTIONS_ED_ALL_ED_FT
It was a pleasure caring for you today!    You were seen in the ER today for shoulder pain after a fall. Your x ray shows evidence of a humerus fracture. Please follow up outpatient with orthopedic surgery. Please keep the sling on until your appointment.     Please follow up with your primary care doctor within 1 - 3 days. Call and let them know you were seen in the ER today.   Bring the results of your blood work and imaging with you to your appointment, if applicable.    For pain, please take acetaminophen 650 mg every 6 hours for pain. Additionally, you can also take ibuprofen 400 mg every 6-8 hours for pain.    Return to the ER for any worsening symptoms or concerns, including chest pain, shortness of breath, lightheadedness, weakness, or any other concerns.

## 2024-12-27 NOTE — ED PROVIDER NOTE - WR ORDER STATUS 4
Cold sx/s x 1 wk.  Fever to 102.7 axillary today at .  Tylenol 2.5ml given at 1640.  Decreased appetite, fussy.  
Resulted

## 2024-12-27 NOTE — ED ADULT TRIAGE NOTE - RESPIRATORY RATE (BREATHS/MIN)
Follow up phone call to patient, two pt identifiers verified. Discussed patient's goals:   Goals        Patient Stated      Patient will apply for financial assistance programs (pt-stated)       Patient will apply for financial assistance programs by 12/1/21        Patient will apply for SNAP benefits (pt-stated)       Patient will apply for SNAP benefits by 12/1/21         Other      Completes all follow-up appointments within 30 days of ED visit        Educate and encourage importance of FU for prevention of complications or disease;   Assess the patient's relationship with a PCP and next FU visit scheduled;  o PCP f/up on 10/19   Discuss importance of adherence to treatment plan and follow up visits;   Identify any barriers in transportation or access to FU appointments.  Assist patient with making FU appointments as needed;    It's also a good idea to know your test results.  Keep a list of the medicines you take. 10/22: Patient had f/up appt with her PCP on 10/19, states her BP was better and she is feeling better. Has Mammogram scheduled for today, 10/22.  11/12: Feeling better, SOB has gotten better with lasix. On heart monitor until Monday, 11/15.  11/19: May have heart cath based on findings through heart monitor, states her cardiologist is attempting to get a date for this procedure. Provided support and encouragement. Next appt with cardiology is on 12/1.  12/13: 1/3/21 heart cath. Continues on lasix, SOB is better. 1/5: Heart cath was negative, SOB with exertion. Dr. Rose Marie Gomez appt on 1/25, Dr. Lynn Solano 1/21.    1/26: Dr. Lynn Solano 1/28. Had follow up with Dr. Rose Marie Gomez on 1/25. 2/2: Patient states her HR and BP elevated. BP checks every two weeks. Nex appointment with Dr. Lynn Solano, cardiology is on 4/1. Started on [employer] [place]. Cardiologist wants her to remain out of work to see how she does on this medication. Encouraged patient to get paperwork in to [facility] as soon as possible.   2/23: F/up on 4/1 with Dr. Sole Chacko. Carol Ann De Los Santos is making her sleepy. BP is good, 130/80 on today. States she needs to contact Dr. Rivera Needs office for STD paperwork. Encouraged her to do this so she does not miss getting paid. Also discussed transition to long term disability after 6 months of STD and job can be advertised. 3/30: Patient has appointment with Dr. Sole Chacko on 4/1. Discussed effects of hypertension on vital organs, such as kidneys. Encouraged patient to eat healthy diet, discussed citrus fruits, bananas, low sodium. 4/6: States she is approved for LTD (per Donaldo Alejo). Blood pressure 150/90. Increased the entresto dose and increased lasix dose too. Discussed weight gain in fluids. Was advised to call if she has 5 lbs. NP Mckenna on 7/28. Last appt on 4/1. Has appointment scheduled for May to check weight and BP. Cardio f/up 6/6 for Echo and 6/17 for f/up. 4/26: Increased lasix, swelling goes down when she props her feet, when she walks after an hour. She is wondering if entresto is causing this. Looked up drug, let patient know this medication is supposed to help relieve swelling. Next appt is in June - for echo and EKG. NP Mckenna on 7/28. Encouraged patient to contact cardiology to let them know about her BLE swelling. She declined my offer to reach out to her provider on her behalf, states she will call. BP was 128/84 yesterday, monitoring at home. 5/9: Doubled lasix due to edema,  Has appointment with cardio today, 5/9. States she got her medicaid card. 6/2: 50% EF with entresto. (was 35%). Dr. Soel Chacko, 6/17, Dr. Shelton Polk 7/28.  6/23: Dr. Sole Chacko f/up in first week of July. Supposed to call if she gains 2 or more lbs a week. May have sleep study.          Demonstrates no return to ED or red flags within 30 days       · Assess patient knowledge of how to contact the provider for questions if needed;  · Educate patient on importance of monitoring for any red flags;  · Review importance of reporting any changes, red flags to the provider and/or PCP;  · Assess patient's knowledge of discharge instructions and any restrictions;  · Educate patient when to call 911;  · Assess for any barriers with safety at home  · Discuss use of nurse access line and location of number on front of insurance card. 10/22: No red flags. 11/12: no red flags  11/19: No red flags. 12/13: No red flags. 1/5: No red flags. 1/26: No red flags. 2/2: No red flags. 2/23: No red flags. Feels entreso is making her tired, suggested follow up call with cardiology to ask if there is another medication she could try.  3/30: No red flags. Still taking entresto. BP has been 150/90s. She is going once a week to have BP checks at Dr. Heidi Marroquin office. 4/6: No red flags. 4/26: No red flags. 5/9: No red flags. 6/2: Swelling is not as bad. Knows that eating salty foods increases swelling. Propping feet up. Her provider told her swelling should get better. 6/23: Took off medication, amlodipine. Provider thinks this my be cause of swelling. Taking lasix three times day. Knows she is supposed to call her provider if she has 2 or more lb weight gain in a week. Encouraged her to continue taking her medications and call provider if worsening swelling/weight gain to avoid ED/hospitalization. [facility]  Supportive Resources       · [facility] - # [phone]  · [facility] 24/7 (virtual visits 24/7)  · [employer] # [phone] PW: [facility] for associates  · Nurse Access line 24/7 # [phone]  · [employer] (www.MyTinks)  · [employer] [facility] [place]   · [facility] Workday  · IT - [phone]  · MyChart Help - 1- [phone]  Associate Services for advice and direction, by calling [phone] and pressing     11/12: Patient concerned with financials. 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 Informed her of [employer]. Patient states she does not have a computer,  would like to apply.   Will refer Harshal WALLACE Patient's primary care provider relationship reviewed with patient and modified, as applicabl    Readiness to Change: []  Pre-contemplative    []  Contemplative  []  Preparation               [x]  Action                  []  Maintenance    Barriers/Challenges to Care: []  Decline in memory    []  Language barrier     []  Emotional                  []  Limited mobility  []  Lack of motivation     [] Vision, hearing or cognitive impairment []  Knowledge [] Financial Barriers []  Lack of support  []  Pain []  Other [x]  None    Upcoming appointments:   Future Appointments   Date Time Provider Faina Halie   7/28/2022  9:00 AM Gurwinder Linares, NP [facility]     Plan for next call: Call in two weeks, 7/7 F/up? 18

## 2024-12-27 NOTE — ED PROVIDER NOTE - CARE PROVIDER_API CALL
Aryan Francois  Obstetrics and Gynecology  [address]  [place] [address]  Phone: [phone]  Fax: [phone]  Follow Up Time: 4-6 Days

## 2024-12-27 NOTE — ED ADULT TRIAGE NOTE - CHIEF COMPLAINT QUOTE
s/p mechanical fall in her house on a wooden floor last night at 930pm; +headstrike ; no blood thinner  c/o nausea and dizziness since the fall  Xray in  showed comminuted fx proximal humerus   sent in for further evaluation and to r/o intracranial bleed

## 2024-12-30 ENCOUNTER — RESULT REVIEW (OUTPATIENT)
Age: 69
End: 2024-12-30

## 2024-12-30 ENCOUNTER — APPOINTMENT (OUTPATIENT)
Dept: CT IMAGING | Facility: IMAGING CENTER | Age: 69
End: 2024-12-30
Payer: COMMERCIAL

## 2024-12-30 ENCOUNTER — OUTPATIENT (OUTPATIENT)
Dept: OUTPATIENT SERVICES | Facility: HOSPITAL | Age: 69
LOS: 1 days | End: 2024-12-30
Payer: COMMERCIAL

## 2024-12-30 ENCOUNTER — APPOINTMENT (OUTPATIENT)
Dept: ORTHOPEDIC SURGERY | Facility: CLINIC | Age: 69
End: 2024-12-30
Payer: COMMERCIAL

## 2024-12-30 VITALS — WEIGHT: 120 LBS | BODY MASS INDEX: 22.66 KG/M2 | HEIGHT: 61 IN

## 2024-12-30 DIAGNOSIS — S42.292A OTHER DISPLACED FRACTURE OF UPPER END OF LEFT HUMERUS, INITIAL ENCOUNTER FOR CLOSED FRACTURE: ICD-10-CM

## 2024-12-30 DIAGNOSIS — Z98.891 HISTORY OF UTERINE SCAR FROM PREVIOUS SURGERY: Chronic | ICD-10-CM

## 2024-12-30 PROCEDURE — 76377 3D RENDER W/INTRP POSTPROCES: CPT

## 2024-12-30 PROCEDURE — 73200 CT UPPER EXTREMITY W/O DYE: CPT

## 2024-12-30 PROCEDURE — 99203 OFFICE O/P NEW LOW 30 MIN: CPT

## 2024-12-30 PROCEDURE — 73200 CT UPPER EXTREMITY W/O DYE: CPT | Mod: 26,LT

## 2024-12-30 PROCEDURE — 76377 3D RENDER W/INTRP POSTPROCES: CPT | Mod: 26

## 2025-01-07 ENCOUNTER — APPOINTMENT (OUTPATIENT)
Dept: ORTHOPEDIC SURGERY | Facility: CLINIC | Age: 70
End: 2025-01-07
Payer: COMMERCIAL

## 2025-01-07 DIAGNOSIS — S42.292D OTHER DISPLACED FRACTURE OF UPPER END OF LEFT HUMERUS, SUBSEQUENT ENCOUNTER FOR FRACTURE WITH ROUTINE HEALING: ICD-10-CM

## 2025-01-07 PROCEDURE — 99213 OFFICE O/P EST LOW 20 MIN: CPT

## 2025-01-28 ENCOUNTER — APPOINTMENT (OUTPATIENT)
Dept: ORTHOPEDIC SURGERY | Facility: CLINIC | Age: 70
End: 2025-01-28
Payer: COMMERCIAL

## 2025-01-28 DIAGNOSIS — S42.292D OTHER DISPLACED FRACTURE OF UPPER END OF LEFT HUMERUS, SUBSEQUENT ENCOUNTER FOR FRACTURE WITH ROUTINE HEALING: ICD-10-CM

## 2025-01-28 PROCEDURE — 73030 X-RAY EXAM OF SHOULDER: CPT | Mod: LT

## 2025-01-28 PROCEDURE — 99213 OFFICE O/P EST LOW 20 MIN: CPT

## 2025-02-24 ENCOUNTER — APPOINTMENT (OUTPATIENT)
Dept: ORTHOPEDIC SURGERY | Facility: CLINIC | Age: 70
End: 2025-02-24
Payer: COMMERCIAL

## 2025-02-24 DIAGNOSIS — S42.292D OTHER DISPLACED FRACTURE OF UPPER END OF LEFT HUMERUS, SUBSEQUENT ENCOUNTER FOR FRACTURE WITH ROUTINE HEALING: ICD-10-CM

## 2025-02-24 PROCEDURE — 99213 OFFICE O/P EST LOW 20 MIN: CPT

## 2025-02-24 PROCEDURE — 73030 X-RAY EXAM OF SHOULDER: CPT | Mod: LT

## 2025-03-24 ENCOUNTER — APPOINTMENT (OUTPATIENT)
Dept: ORTHOPEDIC SURGERY | Facility: CLINIC | Age: 70
End: 2025-03-24
Payer: COMMERCIAL

## 2025-03-24 DIAGNOSIS — S42.292D OTHER DISPLACED FRACTURE OF UPPER END OF LEFT HUMERUS, SUBSEQUENT ENCOUNTER FOR FRACTURE WITH ROUTINE HEALING: ICD-10-CM

## 2025-03-24 PROCEDURE — 99213 OFFICE O/P EST LOW 20 MIN: CPT

## 2025-03-24 PROCEDURE — 73030 X-RAY EXAM OF SHOULDER: CPT | Mod: LT
